# Patient Record
Sex: FEMALE | Race: BLACK OR AFRICAN AMERICAN | NOT HISPANIC OR LATINO
[De-identification: names, ages, dates, MRNs, and addresses within clinical notes are randomized per-mention and may not be internally consistent; named-entity substitution may affect disease eponyms.]

---

## 2017-01-04 ENCOUNTER — RESULT REVIEW (OUTPATIENT)
Age: 52
End: 2017-01-04

## 2017-01-04 NOTE — ASU PATIENT PROFILE, ADULT - VISION (WITH CORRECTIVE LENSES IF THE PATIENT USUALLY WEARS THEM):
needs reading glasses/Partially impaired: cannot see medication labels or newsprint, but can see obstacles in path, and the surrounding layout; can count fingers at arm's length

## 2017-01-04 NOTE — ASU PATIENT PROFILE, ADULT - PMH
Anxiety    Depression (ICD9 311)    Diabetes Mellitus Type II (ICD9 250.00)  No meds at present  GERD (Gastroesophageal Reflux Disease) (ICD9 530.81)    Hyperlipidemia (ICD9 272.4)    Incontinence of Urine (ICD9 788.30)    Insomnia    Lumbar herniated disc    Obesity (ICD9 278.00)    Right Knee arthroscopy 1998, left knee arthroscopy 2005    Umbilical Hernia repair 1997    Uterine leiomyoma

## 2017-01-04 NOTE — ASU PATIENT PROFILE, ADULT - PSH
Female stress incontinence  Interstem insertion  H/O abdominoplasty  9/2016 - [facility]  H/O: hysterectomy    Right knee arthroscopy 1998, left knee arthroscopy 2005    S/P Ankle Ligament Repair  Left-8/20/10  S/P MARIYA-BSO  2007  Tubal Ligation (ICD9 V26.51) 1997    Urinary incontinence  vaginal sling in 2011  Uterine artery embolization, Vaginal sling 01/2007

## 2017-01-05 ENCOUNTER — OUTPATIENT (OUTPATIENT)
Dept: OUTPATIENT SERVICES | Facility: HOSPITAL | Age: 52
LOS: 1 days | Discharge: HOME | End: 2017-01-05
Payer: MEDICAID

## 2017-01-05 ENCOUNTER — APPOINTMENT (OUTPATIENT)
Dept: UROGYNECOLOGY | Facility: HOSPITAL | Age: 52
End: 2017-01-05

## 2017-01-05 VITALS
WEIGHT: 188.94 LBS | TEMPERATURE: 98 F | HEART RATE: 91 BPM | OXYGEN SATURATION: 100 % | DIASTOLIC BLOOD PRESSURE: 97 MMHG | SYSTOLIC BLOOD PRESSURE: 153 MMHG | RESPIRATION RATE: 12 BRPM | HEIGHT: 62 IN

## 2017-01-05 VITALS
TEMPERATURE: 98 F | SYSTOLIC BLOOD PRESSURE: 139 MMHG | OXYGEN SATURATION: 99 % | RESPIRATION RATE: 15 BRPM | DIASTOLIC BLOOD PRESSURE: 71 MMHG | HEART RATE: 68 BPM

## 2017-01-05 DIAGNOSIS — Z98.890 OTHER SPECIFIED POSTPROCEDURAL STATES: Chronic | ICD-10-CM

## 2017-01-05 DIAGNOSIS — M54.5 LOW BACK PAIN: ICD-10-CM

## 2017-01-05 DIAGNOSIS — N39.3 STRESS INCONTINENCE (FEMALE) (MALE): Chronic | ICD-10-CM

## 2017-01-05 PROCEDURE — 88300 SURGICAL PATH GROSS: CPT | Mod: 26

## 2017-01-05 RX ORDER — SODIUM CHLORIDE 9 MG/ML
1000 INJECTION, SOLUTION INTRAVENOUS
Qty: 0 | Refills: 0 | Status: DISCONTINUED | OUTPATIENT
Start: 2017-01-05 | End: 2017-01-06

## 2017-01-05 RX ORDER — CYCLOBENZAPRINE HYDROCHLORIDE 10 MG/1
5 TABLET, FILM COATED ORAL THREE TIMES A DAY
Qty: 0 | Refills: 0 | Status: DISCONTINUED | OUTPATIENT
Start: 2017-01-05 | End: 2017-01-05

## 2017-01-05 RX ORDER — OXYCODONE HYDROCHLORIDE 5 MG/1
1 TABLET ORAL
Qty: 24 | Refills: 0
Start: 2017-01-05

## 2017-01-05 RX ORDER — CYCLOBENZAPRINE HYDROCHLORIDE 10 MG/1
1 TABLET, FILM COATED ORAL
Qty: 15 | Refills: 0
Start: 2017-01-05

## 2017-01-05 NOTE — ASU DISCHARGE PLAN (ADULT/PEDIATRIC). - NOTIFY
Numbness, tingling/GYN Fever>100.4/Numbness, color, or temperature change to extremity/Unable to Urinate/Inability to Tolerate Liquids or Foods/Excessive Diarrhea/Swelling that continues/Pain not relieved by Medications/Increased Irritability or Sluggishness/Persistent Nausea and Vomiting/Bleeding that does not stop

## 2017-01-05 NOTE — ASU DISCHARGE PLAN (ADULT/PEDIATRIC). - COMMENTS
Surgical Unit will call you on the next business day to follow up. Surgical [place] is open Monday - Friday.

## 2017-01-05 NOTE — ASU DISCHARGE PLAN (ADULT/PEDIATRIC). - INSTRUCTIONS
call office for appointment call office for appointment  Call your surgeon's office later today or tomorrow to schedule a follow up appointment.

## 2017-01-05 NOTE — ASU DISCHARGE PLAN (ADULT/PEDIATRIC). - MEDICATION SUMMARY - MEDICATIONS TO TAKE
I will START or STAY ON the medications listed below when I get home from the hospital:    acetaminophen-oxyCODONE 325 mg-5 mg oral tablet  -- 1 tab(s) by mouth every 4 hours, As Needed MDD:6 tabs  -- Caution federal law prohibits the transfer of this drug to any person other  than the person for whom it was prescribed.  May cause drowsiness.  Alcohol may intensify this effect.  Use care when operating dangerous machinery.  This prescription cannot be refilled.  This product contains acetaminophen.  Do not use  with any other product containing acetaminophen to prevent possible liver damage.  Using more of this medication than prescribed may cause serious breathing problems.    -- Indication: For pain    ibuprofen 600 mg oral tablet  -- 1 tab(s) by mouth every 6 hours.  Instructed to stop on 12/28/16  -- Indication: For pain    cyclobenzaprine 5 mg oral tablet  -- 1 tab(s) by mouth 3 times a day, As Needed  -- Indication: For home med I will START or STAY ON the medications listed below when I get home from the hospital:    acetaminophen-oxyCODONE 325 mg-5 mg oral tablet  -- 1 tab(s) by mouth every 4 hours, As Needed MDD:6 tabs  -- Caution federal law prohibits the transfer of this drug to any person other  than the person for whom it was prescribed.  May cause drowsiness.  Alcohol may intensify this effect.  Use care when operating dangerous machinery.  This prescription cannot be refilled.  This product contains acetaminophen.  Do not use  with any other product containing acetaminophen to prevent possible liver damage.  Using more of this medication than prescribed may cause serious breathing problems.    -- Indication: For pain    ibuprofen 600 mg oral tablet  -- 1 tab(s) by mouth every 6 hours.  Instructed to stop on 12/28/16  -- Indication: For pain    cyclobenzaprine 5 mg oral tablet  -- 1 tab(s) by mouth 3 times a day, As Needed  -- Some non-prescription drugs may aggravate your condition.  Read all labels carefully.  If a warning appears, check with your doctor before taking.  This drug may impair the ability to drive or operate machinery.  Use care until you become familiar with its effects.    -- Indication: For home med

## 2017-01-05 NOTE — ASU DISCHARGE PLAN (ADULT/PEDIATRIC). - ACTIVITY LEVEL
no intercourse/nothing per vagina/no tub baths/no douching/no tampons no tampons/no intercourse/nothing per vagina/no douching/no sports/gym/no tub baths/no heavy lifting/no exercise

## 2017-01-05 NOTE — ASU DISCHARGE PLAN (ADULT/PEDIATRIC). - CONDITIONS AT DISCHARGE
Patient is stable and meets discharge criteria. Patient made aware that he/she must wait on unit to be escorted to awaiting car in front of the main building after being discharged by Anesthesia Department.

## 2017-01-05 NOTE — BRIEF OPERATIVE NOTE - POST-OP DX
Chronic midline low back pain, with sciatica presence unspecified  01/05/2017    Active  Rose Marie Schroeder

## 2017-01-05 NOTE — ASU DISCHARGE PLAN (ADULT/PEDIATRIC). - NURSING INSTRUCTIONS
See medication reconcilliation record   When taking pain meds - take with food and know it may cause constipation. To prevent constipation increase fluids and fiber in diet. - Do NOT drive while on narcotics.   You were given IV Tylenol for pain management.  Please DO NOT take tylenol for the next 6-8 hours (until _6:45pm__ ). Please do not exceed 3000mg in 24hours. See medication reconcilliation record   When taking pain meds - take with food and know it may cause constipation. To prevent constipation increase fluids and fiber in diet. - Do NOT drive while on narcotics.

## 2017-01-09 LAB — SURGICAL PATHOLOGY STUDY: SIGNIFICANT CHANGE UP

## 2017-01-12 ENCOUNTER — FORM ENCOUNTER (OUTPATIENT)
Age: 52
End: 2017-01-12

## 2017-01-13 ENCOUNTER — OUTPATIENT (OUTPATIENT)
Dept: OUTPATIENT SERVICES | Facility: HOSPITAL | Age: 52
LOS: 1 days | End: 2017-01-13
Payer: MEDICAID

## 2017-01-13 ENCOUNTER — APPOINTMENT (OUTPATIENT)
Dept: CT IMAGING | Facility: CLINIC | Age: 52
End: 2017-01-13

## 2017-01-13 DIAGNOSIS — Z00.8 ENCOUNTER FOR OTHER GENERAL EXAMINATION: ICD-10-CM

## 2017-01-13 DIAGNOSIS — Z98.890 OTHER SPECIFIED POSTPROCEDURAL STATES: Chronic | ICD-10-CM

## 2017-01-13 DIAGNOSIS — N39.3 STRESS INCONTINENCE (FEMALE) (MALE): Chronic | ICD-10-CM

## 2017-01-13 PROCEDURE — 74177 CT ABD & PELVIS W/CONTRAST: CPT

## 2017-01-18 ENCOUNTER — APPOINTMENT (OUTPATIENT)
Dept: UROGYNECOLOGY | Facility: CLINIC | Age: 52
End: 2017-01-18

## 2017-01-18 ENCOUNTER — MESSAGE (OUTPATIENT)
Age: 52
End: 2017-01-18

## 2017-01-18 VITALS
TEMPERATURE: 98.1 F | RESPIRATION RATE: 16 BRPM | SYSTOLIC BLOOD PRESSURE: 130 MMHG | HEART RATE: 70 BPM | DIASTOLIC BLOOD PRESSURE: 74 MMHG

## 2017-01-18 DIAGNOSIS — R39.15 URGENCY OF URINATION: ICD-10-CM

## 2017-01-30 ENCOUNTER — MESSAGE (OUTPATIENT)
Age: 52
End: 2017-01-30

## 2017-03-14 ENCOUNTER — APPOINTMENT (OUTPATIENT)
Dept: SURGERY | Facility: HOSPITAL | Age: 52
End: 2017-03-14

## 2017-05-01 ENCOUNTER — OUTPATIENT (OUTPATIENT)
Dept: OUTPATIENT SERVICES | Facility: HOSPITAL | Age: 52
LOS: 1 days | End: 2017-05-01
Payer: MEDICAID

## 2017-05-01 DIAGNOSIS — Z98.890 OTHER SPECIFIED POSTPROCEDURAL STATES: Chronic | ICD-10-CM

## 2017-05-01 DIAGNOSIS — N39.3 STRESS INCONTINENCE (FEMALE) (MALE): Chronic | ICD-10-CM

## 2017-05-12 ENCOUNTER — OUTPATIENT (OUTPATIENT)
Dept: OUTPATIENT SERVICES | Facility: HOSPITAL | Age: 52
LOS: 1 days | Discharge: HOME | End: 2017-05-12

## 2017-05-12 VITALS
OXYGEN SATURATION: 100 % | SYSTOLIC BLOOD PRESSURE: 116 MMHG | TEMPERATURE: 98 F | WEIGHT: 179.9 LBS | HEART RATE: 70 BPM | RESPIRATION RATE: 14 BRPM | HEIGHT: 62 IN | DIASTOLIC BLOOD PRESSURE: 83 MMHG

## 2017-05-12 DIAGNOSIS — Z98.890 OTHER SPECIFIED POSTPROCEDURAL STATES: Chronic | ICD-10-CM

## 2017-05-12 DIAGNOSIS — K43.2 INCISIONAL HERNIA WITHOUT OBSTRUCTION OR GANGRENE: ICD-10-CM

## 2017-05-12 DIAGNOSIS — N39.3 STRESS INCONTINENCE (FEMALE) (MALE): Chronic | ICD-10-CM

## 2017-05-12 LAB
ABO RH CONFIRMATION: SIGNIFICANT CHANGE UP
ANION GAP SERPL CALC-SCNC: 9 MMOL/L — SIGNIFICANT CHANGE UP (ref 5–17)
APTT BLD: 39.2 SEC — HIGH (ref 27.5–37.4)
BASOPHILS # BLD AUTO: 0.1 K/UL — SIGNIFICANT CHANGE UP (ref 0–0.2)
BASOPHILS NFR BLD AUTO: 1.2 % — SIGNIFICANT CHANGE UP (ref 0–2)
BLD GP AB SCN SERPL QL: SIGNIFICANT CHANGE UP
BUN SERPL-MCNC: 17 MG/DL — SIGNIFICANT CHANGE UP (ref 7–23)
CALCIUM SERPL-MCNC: 8.7 MG/DL — SIGNIFICANT CHANGE UP (ref 8.5–10.1)
CHLORIDE SERPL-SCNC: 107 MMOL/L — SIGNIFICANT CHANGE UP (ref 96–108)
CO2 SERPL-SCNC: 27 MMOL/L — SIGNIFICANT CHANGE UP (ref 22–31)
CREAT SERPL-MCNC: 0.69 MG/DL — SIGNIFICANT CHANGE UP (ref 0.5–1.3)
EOSINOPHIL # BLD AUTO: 0.2 K/UL — SIGNIFICANT CHANGE UP (ref 0–0.5)
EOSINOPHIL NFR BLD AUTO: 3 % — SIGNIFICANT CHANGE UP (ref 0–6)
GLUCOSE SERPL-MCNC: 104 MG/DL — HIGH (ref 70–99)
HCT VFR BLD CALC: 40.1 % — SIGNIFICANT CHANGE UP (ref 34.5–45)
HGB BLD-MCNC: 12.8 G/DL — SIGNIFICANT CHANGE UP (ref 11.5–15.5)
INR BLD: 0.98 RATIO — SIGNIFICANT CHANGE UP (ref 0.88–1.16)
LYMPHOCYTES # BLD AUTO: 2.9 K/UL — SIGNIFICANT CHANGE UP (ref 1–3.3)
LYMPHOCYTES # BLD AUTO: 47 % — HIGH (ref 13–44)
MCHC RBC-ENTMCNC: 28.1 PG — SIGNIFICANT CHANGE UP (ref 27–34)
MCHC RBC-ENTMCNC: 31.8 GM/DL — LOW (ref 32–36)
MCV RBC AUTO: 88.3 FL — SIGNIFICANT CHANGE UP (ref 80–100)
MONOCYTES # BLD AUTO: 0.5 K/UL — SIGNIFICANT CHANGE UP (ref 0–0.9)
MONOCYTES NFR BLD AUTO: 7.5 % — SIGNIFICANT CHANGE UP (ref 2–14)
NEUTROPHILS # BLD AUTO: 2.5 K/UL — SIGNIFICANT CHANGE UP (ref 1.8–7.4)
NEUTROPHILS NFR BLD AUTO: 41.3 % — LOW (ref 43–77)
PLATELET # BLD AUTO: 189 K/UL — SIGNIFICANT CHANGE UP (ref 150–400)
POTASSIUM SERPL-MCNC: 3.9 MMOL/L — SIGNIFICANT CHANGE UP (ref 3.5–5.3)
POTASSIUM SERPL-SCNC: 3.9 MMOL/L — SIGNIFICANT CHANGE UP (ref 3.5–5.3)
PROTHROM AB SERPL-ACNC: 10.6 SEC — SIGNIFICANT CHANGE UP (ref 9.8–12.7)
RBC # BLD: 4.54 M/UL — SIGNIFICANT CHANGE UP (ref 3.8–5.2)
RBC # FLD: 14.1 % — SIGNIFICANT CHANGE UP (ref 10.3–14.5)
SODIUM SERPL-SCNC: 143 MMOL/L — SIGNIFICANT CHANGE UP (ref 135–145)
TYPE + AB SCN PNL BLD: SIGNIFICANT CHANGE UP
WBC # BLD: 6.1 K/UL — SIGNIFICANT CHANGE UP (ref 3.8–10.5)
WBC # FLD AUTO: 6.1 K/UL — SIGNIFICANT CHANGE UP (ref 3.8–10.5)

## 2017-05-12 NOTE — H&P PST ADULT - ASSESSMENT
This is a  51y /o [place] Female who speaks fluent English, who presents to [facility] prior to proposed procedure with Dr. Hwang for laparoscopic possible open incisional hernia repair with mesh, possible component separation .    1. Labs: per Dr. Hwang.  2. EKG on chart from Dr. Echevarria office.  3. Clearance completed per patient with Dr. Larios, 5/11/2017.  4. EZ sponges, Mupirocin ointment holistic sheet, pamphlet and day of procedure instructions provided and reviewed with patient.

## 2017-05-12 NOTE — H&P PST ADULT - HISTORY OF PRESENT ILLNESS
This is a  51y /o Mexican Female who speaks fluent English, who presents to [facility] prior to proposed procedure with Dr. Hwang for laparoscopic possible open incisional hernia repair with mesh, possible component separation . Reports s/p abdominoplasty 9/7/2016 and during healing of site had what appeared to be " a little pimple", which got bigger and bigger to inscion. Reports plastic surgeon sent her for CT scan of abdomen which confirmed hernia at incision and  referred to general surgeon. Denies pain at incisional site, fever or chills. Reports frequent itching sensation to incisional hernia site. Evaluated by Dr. Hwang and now presents for said procedure.

## 2017-05-12 NOTE — H&P PST ADULT - PMH
Anxiety    Depression (ICD9 311)    Diabetes Mellitus Type II (ICD9 250.00)  No meds at present  GERD (Gastroesophageal Reflux Disease) (ICD9 530.81)    Incisional hernia    Liver cyst  2017  Obesity (ICD9 278.00)    Right Knee arthroscopy 1998, left knee arthroscopy 2005    Umbilical Hernia repair 1997    Uterine leiomyoma

## 2017-05-12 NOTE — H&P PST ADULT - PSH
Female stress incontinence  Interstem insertion and removed 1/2017  H/O abdominoplasty  9/2016 - [facility]  H/O: hysterectomy  2007  Right knee arthroscopy 1998, left knee arthroscopy 2005 1998  S/P Ankle Ligament Repair  Left-8/20/10  S/P MARIYA-BSO  2007  Tubal Ligation (ICD9 V26.51) 1997 1997  Urinary incontinence  vaginal sling in 2011 and removed 1/2017  Uterine artery embolization, Vaginal sling 01/2007

## 2017-05-12 NOTE — H&P PST ADULT - VISION (WITH CORRECTIVE LENSES IF THE PATIENT USUALLY WEARS THEM):
Progressive lens'/Partially impaired: cannot see medication labels or newsprint, but can see obstacles in path, and the surrounding layout; can count fingers at arm's length

## 2017-05-15 ENCOUNTER — APPOINTMENT (OUTPATIENT)
Dept: MRI IMAGING | Facility: CLINIC | Age: 52
End: 2017-05-15

## 2017-05-16 ENCOUNTER — APPOINTMENT (OUTPATIENT)
Dept: MRI IMAGING | Facility: CLINIC | Age: 52
End: 2017-05-16

## 2017-05-16 ENCOUNTER — OUTPATIENT (OUTPATIENT)
Dept: OUTPATIENT SERVICES | Facility: HOSPITAL | Age: 52
LOS: 1 days | End: 2017-05-16
Payer: MEDICAID

## 2017-05-16 DIAGNOSIS — Z98.890 OTHER SPECIFIED POSTPROCEDURAL STATES: Chronic | ICD-10-CM

## 2017-05-16 DIAGNOSIS — Z00.8 ENCOUNTER FOR OTHER GENERAL EXAMINATION: ICD-10-CM

## 2017-05-16 DIAGNOSIS — N39.3 STRESS INCONTINENCE (FEMALE) (MALE): Chronic | ICD-10-CM

## 2017-05-17 PROCEDURE — 82565 ASSAY OF CREATININE: CPT

## 2017-05-17 PROCEDURE — 74183 MRI ABD W/O CNTR FLWD CNTR: CPT

## 2017-05-17 PROCEDURE — A9585: CPT

## 2017-05-18 RX ORDER — SODIUM CHLORIDE 9 MG/ML
1000 INJECTION INTRAMUSCULAR; INTRAVENOUS; SUBCUTANEOUS
Qty: 0 | Refills: 0 | Status: DISCONTINUED | OUTPATIENT
Start: 2017-05-19 | End: 2017-05-20

## 2017-05-18 RX ORDER — HYDROMORPHONE HYDROCHLORIDE 2 MG/ML
0.3 INJECTION INTRAMUSCULAR; INTRAVENOUS; SUBCUTANEOUS
Qty: 0 | Refills: 0 | Status: DISCONTINUED | OUTPATIENT
Start: 2017-05-19 | End: 2017-05-20

## 2017-05-18 RX ORDER — OXYCODONE HYDROCHLORIDE 5 MG/1
5 TABLET ORAL EVERY 4 HOURS
Qty: 0 | Refills: 0 | Status: DISCONTINUED | OUTPATIENT
Start: 2017-05-19 | End: 2017-05-20

## 2017-05-19 ENCOUNTER — INPATIENT (INPATIENT)
Facility: HOSPITAL | Age: 52
LOS: 9 days | Discharge: HOME | End: 2017-05-29
Attending: SURGERY | Admitting: SURGERY
Payer: MEDICAID

## 2017-05-19 ENCOUNTER — RESULT REVIEW (OUTPATIENT)
Age: 52
End: 2017-05-19

## 2017-05-19 VITALS
OXYGEN SATURATION: 100 % | SYSTOLIC BLOOD PRESSURE: 108 MMHG | RESPIRATION RATE: 16 BRPM | HEART RATE: 66 BPM | WEIGHT: 179.9 LBS | HEIGHT: 62 IN | TEMPERATURE: 98 F | DIASTOLIC BLOOD PRESSURE: 69 MMHG

## 2017-05-19 DIAGNOSIS — N39.3 STRESS INCONTINENCE (FEMALE) (MALE): Chronic | ICD-10-CM

## 2017-05-19 DIAGNOSIS — Z98.890 OTHER SPECIFIED POSTPROCEDURAL STATES: Chronic | ICD-10-CM

## 2017-05-19 LAB
ANION GAP SERPL CALC-SCNC: 9 MMOL/L — SIGNIFICANT CHANGE UP (ref 5–17)
APTT BLD: 34.8 SEC — SIGNIFICANT CHANGE UP (ref 27.5–37.4)
BUN SERPL-MCNC: 12 MG/DL — SIGNIFICANT CHANGE UP (ref 7–23)
CALCIUM SERPL-MCNC: 7.9 MG/DL — LOW (ref 8.5–10.1)
CHLORIDE SERPL-SCNC: 111 MMOL/L — HIGH (ref 96–108)
CO2 SERPL-SCNC: 24 MMOL/L — SIGNIFICANT CHANGE UP (ref 22–31)
CREAT SERPL-MCNC: 0.68 MG/DL — SIGNIFICANT CHANGE UP (ref 0.5–1.3)
GLUCOSE SERPL-MCNC: 151 MG/DL — HIGH (ref 70–99)
HCT VFR BLD CALC: 38.2 % — SIGNIFICANT CHANGE UP (ref 34.5–45)
HGB BLD-MCNC: 12.5 G/DL — SIGNIFICANT CHANGE UP (ref 11.5–15.5)
INR BLD: 1.08 RATIO — SIGNIFICANT CHANGE UP (ref 0.88–1.16)
MAGNESIUM SERPL-MCNC: 1.9 MG/DL — SIGNIFICANT CHANGE UP (ref 1.6–2.6)
MCHC RBC-ENTMCNC: 28.3 PG — SIGNIFICANT CHANGE UP (ref 27–34)
MCHC RBC-ENTMCNC: 32.7 GM/DL — SIGNIFICANT CHANGE UP (ref 32–36)
MCV RBC AUTO: 86.4 FL — SIGNIFICANT CHANGE UP (ref 80–100)
PLATELET # BLD AUTO: 190 K/UL — SIGNIFICANT CHANGE UP (ref 150–400)
POTASSIUM SERPL-MCNC: 3.7 MMOL/L — SIGNIFICANT CHANGE UP (ref 3.5–5.3)
POTASSIUM SERPL-SCNC: 3.7 MMOL/L — SIGNIFICANT CHANGE UP (ref 3.5–5.3)
PROTHROM AB SERPL-ACNC: 11.7 SEC — SIGNIFICANT CHANGE UP (ref 9.8–12.7)
RBC # BLD: 4.42 M/UL — SIGNIFICANT CHANGE UP (ref 3.8–5.2)
RBC # FLD: 13.7 % — SIGNIFICANT CHANGE UP (ref 10.3–14.5)
SODIUM SERPL-SCNC: 144 MMOL/L — SIGNIFICANT CHANGE UP (ref 135–145)
WBC # BLD: 14.1 K/UL — HIGH (ref 3.8–10.5)
WBC # FLD AUTO: 14.1 K/UL — HIGH (ref 3.8–10.5)

## 2017-05-19 RX ORDER — ONDANSETRON 8 MG/1
4 TABLET, FILM COATED ORAL EVERY 6 HOURS
Qty: 0 | Refills: 0 | Status: DISCONTINUED | OUTPATIENT
Start: 2017-05-19 | End: 2017-05-29

## 2017-05-19 RX ORDER — NALOXONE HYDROCHLORIDE 4 MG/.1ML
0.1 SPRAY NASAL
Qty: 0 | Refills: 0 | Status: DISCONTINUED | OUTPATIENT
Start: 2017-05-19 | End: 2017-05-29

## 2017-05-19 RX ORDER — SODIUM CHLORIDE 9 MG/ML
3 INJECTION INTRAMUSCULAR; INTRAVENOUS; SUBCUTANEOUS EVERY 8 HOURS
Qty: 0 | Refills: 0 | Status: DISCONTINUED | OUTPATIENT
Start: 2017-05-19 | End: 2017-05-19

## 2017-05-19 RX ORDER — SODIUM CHLORIDE 9 MG/ML
1000 INJECTION, SOLUTION INTRAVENOUS
Qty: 0 | Refills: 0 | Status: DISCONTINUED | OUTPATIENT
Start: 2017-05-19 | End: 2017-05-22

## 2017-05-19 RX ORDER — CEFAZOLIN SODIUM 1 G
2000 VIAL (EA) INJECTION EVERY 8 HOURS
Qty: 0 | Refills: 0 | Status: DISCONTINUED | OUTPATIENT
Start: 2017-05-19 | End: 2017-05-29

## 2017-05-19 RX ORDER — HEPARIN SODIUM 5000 [USP'U]/ML
5000 INJECTION INTRAVENOUS; SUBCUTANEOUS EVERY 8 HOURS
Qty: 0 | Refills: 0 | Status: DISCONTINUED | OUTPATIENT
Start: 2017-05-19 | End: 2017-05-29

## 2017-05-19 RX ORDER — ACETAMINOPHEN 500 MG
1000 TABLET ORAL ONCE
Qty: 0 | Refills: 0 | Status: COMPLETED | OUTPATIENT
Start: 2017-05-19 | End: 2017-05-19

## 2017-05-19 RX ORDER — HYDROMORPHONE HYDROCHLORIDE 2 MG/ML
30 INJECTION INTRAMUSCULAR; INTRAVENOUS; SUBCUTANEOUS
Qty: 0 | Refills: 0 | Status: DISCONTINUED | OUTPATIENT
Start: 2017-05-19 | End: 2017-05-23

## 2017-05-19 RX ORDER — ONDANSETRON 8 MG/1
4 TABLET, FILM COATED ORAL ONCE
Qty: 0 | Refills: 0 | Status: DISCONTINUED | OUTPATIENT
Start: 2017-05-19 | End: 2017-05-19

## 2017-05-19 RX ADMIN — HYDROMORPHONE HYDROCHLORIDE 30 MILLILITER(S): 2 INJECTION INTRAMUSCULAR; INTRAVENOUS; SUBCUTANEOUS at 17:55

## 2017-05-19 RX ADMIN — SODIUM CHLORIDE 75 MILLILITER(S): 9 INJECTION INTRAMUSCULAR; INTRAVENOUS; SUBCUTANEOUS at 23:16

## 2017-05-19 RX ADMIN — SODIUM CHLORIDE 75 MILLILITER(S): 9 INJECTION INTRAMUSCULAR; INTRAVENOUS; SUBCUTANEOUS at 17:56

## 2017-05-19 RX ADMIN — Medication 400 MILLIGRAM(S): at 18:27

## 2017-05-19 NOTE — BRIEF OPERATIVE NOTE - POST-OP DX
Scar of abdominal skin  05/19/2017    Active  Abbey Alicea
Incisional hernia, without obstruction or gangrene  05/19/2017    Active  Ron Glaser  Intestinal adhesions  05/19/2017    Active  Ron Glaser  Scar of abdominal skin  05/19/2017    Active  Abbey Alicea

## 2017-05-19 NOTE — ASU PATIENT PROFILE, ADULT - PSH
Female stress incontinence  Interstem insertion and removed 1/2017  H/O abdominoplasty  9/2016 - [facility]  H/O: hysterectomy  2007  Right knee arthroscopy 1998, left knee arthroscopy 2005 1998  S/P Ankle Ligament Repair  Left-8/20/10  S/P MARIYA-BSO  2007  Tubal Ligation (ICD9 V26.51) 1997 1997  Urinary incontinence  vaginal sling in 2011 and removed 1/2017  Uterine artery embolization, Vaginal sling 01/2007

## 2017-05-19 NOTE — BRIEF OPERATIVE NOTE - PROCEDURE
Revision of abdominoplasty  05/19/2017  Abdominal wall plication, revision of abdominal scar  Active  [email or web address]
Incisional hernia repair with mesh  05/19/2017    Active  BARBARAVIN

## 2017-05-19 NOTE — BRIEF OPERATIVE NOTE - PRE-OP DX
Scar of abdominal skin  05/19/2017    Active  Abbey Alicea
Adhesion of intestine  05/19/2017    Active  Ron Glaser  Incisional hernia, without obstruction or gangrene  05/19/2017    Active  Ron Glaser  Scar of abdominal skin  05/19/2017    Active  Abbey Alicea

## 2017-05-20 LAB
ANION GAP SERPL CALC-SCNC: 10 MMOL/L — SIGNIFICANT CHANGE UP (ref 5–17)
BASOPHILS # BLD AUTO: 0 K/UL — SIGNIFICANT CHANGE UP (ref 0–0.2)
BASOPHILS NFR BLD AUTO: 0.2 % — SIGNIFICANT CHANGE UP (ref 0–2)
BUN SERPL-MCNC: 8 MG/DL — SIGNIFICANT CHANGE UP (ref 7–23)
CALCIUM SERPL-MCNC: 7.9 MG/DL — LOW (ref 8.5–10.1)
CHLORIDE SERPL-SCNC: 108 MMOL/L — SIGNIFICANT CHANGE UP (ref 96–108)
CO2 SERPL-SCNC: 24 MMOL/L — SIGNIFICANT CHANGE UP (ref 22–31)
CREAT SERPL-MCNC: 0.66 MG/DL — SIGNIFICANT CHANGE UP (ref 0.5–1.3)
EOSINOPHIL # BLD AUTO: 0 K/UL — SIGNIFICANT CHANGE UP (ref 0–0.5)
EOSINOPHIL NFR BLD AUTO: 0 % — SIGNIFICANT CHANGE UP (ref 0–6)
GLUCOSE SERPL-MCNC: 133 MG/DL — HIGH (ref 70–99)
HCT VFR BLD CALC: 36.6 % — SIGNIFICANT CHANGE UP (ref 34.5–45)
HGB BLD-MCNC: 11.7 G/DL — SIGNIFICANT CHANGE UP (ref 11.5–15.5)
LYMPHOCYTES # BLD AUTO: 1.5 K/UL — SIGNIFICANT CHANGE UP (ref 1–3.3)
LYMPHOCYTES # BLD AUTO: 13.2 % — SIGNIFICANT CHANGE UP (ref 13–44)
MCHC RBC-ENTMCNC: 28.7 PG — SIGNIFICANT CHANGE UP (ref 27–34)
MCHC RBC-ENTMCNC: 31.8 GM/DL — LOW (ref 32–36)
MCV RBC AUTO: 90 FL — SIGNIFICANT CHANGE UP (ref 80–100)
MONOCYTES # BLD AUTO: 0.6 K/UL — SIGNIFICANT CHANGE UP (ref 0–0.9)
MONOCYTES NFR BLD AUTO: 5.4 % — SIGNIFICANT CHANGE UP (ref 2–14)
NEUTROPHILS # BLD AUTO: 9.3 K/UL — HIGH (ref 1.8–7.4)
NEUTROPHILS NFR BLD AUTO: 81.2 % — HIGH (ref 43–77)
PLATELET # BLD AUTO: 182 K/UL — SIGNIFICANT CHANGE UP (ref 150–400)
POTASSIUM SERPL-MCNC: 3.8 MMOL/L — SIGNIFICANT CHANGE UP (ref 3.5–5.3)
POTASSIUM SERPL-SCNC: 3.8 MMOL/L — SIGNIFICANT CHANGE UP (ref 3.5–5.3)
RBC # BLD: 4.07 M/UL — SIGNIFICANT CHANGE UP (ref 3.8–5.2)
RBC # FLD: 14 % — SIGNIFICANT CHANGE UP (ref 10.3–14.5)
SODIUM SERPL-SCNC: 142 MMOL/L — SIGNIFICANT CHANGE UP (ref 135–145)
WBC # BLD: 11.5 K/UL — HIGH (ref 3.8–10.5)
WBC # FLD AUTO: 11.5 K/UL — HIGH (ref 3.8–10.5)

## 2017-05-20 RX ORDER — DIPHENHYDRAMINE HCL 50 MG
50 CAPSULE ORAL EVERY 6 HOURS
Qty: 0 | Refills: 0 | Status: DISCONTINUED | OUTPATIENT
Start: 2017-05-20 | End: 2017-05-29

## 2017-05-20 RX ADMIN — Medication 50 MILLIGRAM(S): at 13:24

## 2017-05-20 RX ADMIN — Medication 100 MILLIGRAM(S): at 23:19

## 2017-05-20 RX ADMIN — HEPARIN SODIUM 5000 UNIT(S): 5000 INJECTION INTRAVENOUS; SUBCUTANEOUS at 00:17

## 2017-05-20 RX ADMIN — Medication 100 MILLIGRAM(S): at 00:17

## 2017-05-20 RX ADMIN — Medication 100 MILLIGRAM(S): at 13:39

## 2017-05-20 RX ADMIN — HEPARIN SODIUM 5000 UNIT(S): 5000 INJECTION INTRAVENOUS; SUBCUTANEOUS at 06:05

## 2017-05-20 RX ADMIN — HEPARIN SODIUM 5000 UNIT(S): 5000 INJECTION INTRAVENOUS; SUBCUTANEOUS at 23:19

## 2017-05-20 RX ADMIN — Medication 50 MILLIGRAM(S): at 23:20

## 2017-05-20 RX ADMIN — SODIUM CHLORIDE 75 MILLILITER(S): 9 INJECTION INTRAMUSCULAR; INTRAVENOUS; SUBCUTANEOUS at 12:07

## 2017-05-20 RX ADMIN — HEPARIN SODIUM 5000 UNIT(S): 5000 INJECTION INTRAVENOUS; SUBCUTANEOUS at 13:39

## 2017-05-20 RX ADMIN — Medication 100 MILLIGRAM(S): at 06:05

## 2017-05-21 DIAGNOSIS — K91.3 POSTPROCEDURAL INTESTINAL OBSTRUCTION: ICD-10-CM

## 2017-05-21 DIAGNOSIS — K43.2 INCISIONAL HERNIA WITHOUT OBSTRUCTION OR GANGRENE: ICD-10-CM

## 2017-05-21 RX ORDER — PANTOPRAZOLE SODIUM 20 MG/1
40 TABLET, DELAYED RELEASE ORAL EVERY 12 HOURS
Qty: 0 | Refills: 0 | Status: COMPLETED | OUTPATIENT
Start: 2017-05-21 | End: 2017-05-23

## 2017-05-21 RX ADMIN — HEPARIN SODIUM 5000 UNIT(S): 5000 INJECTION INTRAVENOUS; SUBCUTANEOUS at 05:26

## 2017-05-21 RX ADMIN — ONDANSETRON 4 MILLIGRAM(S): 8 TABLET, FILM COATED ORAL at 08:18

## 2017-05-21 RX ADMIN — HEPARIN SODIUM 5000 UNIT(S): 5000 INJECTION INTRAVENOUS; SUBCUTANEOUS at 21:16

## 2017-05-21 RX ADMIN — Medication 100 MILLIGRAM(S): at 13:29

## 2017-05-21 RX ADMIN — ONDANSETRON 4 MILLIGRAM(S): 8 TABLET, FILM COATED ORAL at 13:29

## 2017-05-21 RX ADMIN — SODIUM CHLORIDE 125 MILLILITER(S): 9 INJECTION, SOLUTION INTRAVENOUS at 17:33

## 2017-05-21 RX ADMIN — Medication 100 MILLIGRAM(S): at 21:15

## 2017-05-21 RX ADMIN — PANTOPRAZOLE SODIUM 40 MILLIGRAM(S): 20 TABLET, DELAYED RELEASE ORAL at 17:51

## 2017-05-21 RX ADMIN — ONDANSETRON 4 MILLIGRAM(S): 8 TABLET, FILM COATED ORAL at 02:05

## 2017-05-21 RX ADMIN — HEPARIN SODIUM 5000 UNIT(S): 5000 INJECTION INTRAVENOUS; SUBCUTANEOUS at 13:30

## 2017-05-21 RX ADMIN — Medication 100 MILLIGRAM(S): at 05:25

## 2017-05-21 RX ADMIN — SODIUM CHLORIDE 125 MILLILITER(S): 9 INJECTION, SOLUTION INTRAVENOUS at 08:18

## 2017-05-22 PROCEDURE — 88302 TISSUE EXAM BY PATHOLOGIST: CPT | Mod: 26

## 2017-05-22 RX ORDER — DEXTROSE MONOHYDRATE, SODIUM CHLORIDE, AND POTASSIUM CHLORIDE 50; .745; 4.5 G/1000ML; G/1000ML; G/1000ML
1000 INJECTION, SOLUTION INTRAVENOUS
Qty: 0 | Refills: 0 | Status: DISCONTINUED | OUTPATIENT
Start: 2017-05-22 | End: 2017-05-23

## 2017-05-22 RX ADMIN — Medication 100 MILLIGRAM(S): at 15:10

## 2017-05-22 RX ADMIN — Medication 100 MILLIGRAM(S): at 06:00

## 2017-05-22 RX ADMIN — PANTOPRAZOLE SODIUM 40 MILLIGRAM(S): 20 TABLET, DELAYED RELEASE ORAL at 05:58

## 2017-05-22 RX ADMIN — HEPARIN SODIUM 5000 UNIT(S): 5000 INJECTION INTRAVENOUS; SUBCUTANEOUS at 05:58

## 2017-05-22 RX ADMIN — HEPARIN SODIUM 5000 UNIT(S): 5000 INJECTION INTRAVENOUS; SUBCUTANEOUS at 15:10

## 2017-05-22 RX ADMIN — PANTOPRAZOLE SODIUM 40 MILLIGRAM(S): 20 TABLET, DELAYED RELEASE ORAL at 17:51

## 2017-05-22 RX ADMIN — Medication 50 MILLIGRAM(S): at 21:34

## 2017-05-22 RX ADMIN — Medication 100 MILLIGRAM(S): at 21:08

## 2017-05-22 RX ADMIN — HEPARIN SODIUM 5000 UNIT(S): 5000 INJECTION INTRAVENOUS; SUBCUTANEOUS at 21:09

## 2017-05-22 RX ADMIN — DEXTROSE MONOHYDRATE, SODIUM CHLORIDE, AND POTASSIUM CHLORIDE 100 MILLILITER(S): 50; .745; 4.5 INJECTION, SOLUTION INTRAVENOUS at 12:09

## 2017-05-23 RX ORDER — HYDROMORPHONE HYDROCHLORIDE 2 MG/ML
1 INJECTION INTRAMUSCULAR; INTRAVENOUS; SUBCUTANEOUS EVERY 6 HOURS
Qty: 0 | Refills: 0 | Status: DISCONTINUED | OUTPATIENT
Start: 2017-05-23 | End: 2017-05-29

## 2017-05-23 RX ORDER — DEXTROSE MONOHYDRATE, SODIUM CHLORIDE, AND POTASSIUM CHLORIDE 50; .745; 4.5 G/1000ML; G/1000ML; G/1000ML
1000 INJECTION, SOLUTION INTRAVENOUS
Qty: 0 | Refills: 0 | Status: DISCONTINUED | OUTPATIENT
Start: 2017-05-23 | End: 2017-05-24

## 2017-05-23 RX ORDER — HYDROXYZINE HCL 10 MG
25 TABLET ORAL EVERY 4 HOURS
Qty: 0 | Refills: 0 | Status: DISCONTINUED | OUTPATIENT
Start: 2017-05-23 | End: 2017-05-23

## 2017-05-23 RX ORDER — HYDROXYZINE HCL 10 MG
25 TABLET ORAL EVERY 6 HOURS
Qty: 0 | Refills: 0 | Status: DISCONTINUED | OUTPATIENT
Start: 2017-05-23 | End: 2017-05-29

## 2017-05-23 RX ORDER — ACETAMINOPHEN 500 MG
650 TABLET ORAL EVERY 4 HOURS
Qty: 0 | Refills: 0 | Status: DISCONTINUED | OUTPATIENT
Start: 2017-05-23 | End: 2017-05-29

## 2017-05-23 RX ORDER — OXYCODONE HYDROCHLORIDE 5 MG/1
5 TABLET ORAL EVERY 4 HOURS
Qty: 0 | Refills: 0 | Status: DISCONTINUED | OUTPATIENT
Start: 2017-05-23 | End: 2017-05-29

## 2017-05-23 RX ADMIN — Medication 100 MILLIGRAM(S): at 21:53

## 2017-05-23 RX ADMIN — HEPARIN SODIUM 5000 UNIT(S): 5000 INJECTION INTRAVENOUS; SUBCUTANEOUS at 06:08

## 2017-05-23 RX ADMIN — DEXTROSE MONOHYDRATE, SODIUM CHLORIDE, AND POTASSIUM CHLORIDE 75 MILLILITER(S): 50; .745; 4.5 INJECTION, SOLUTION INTRAVENOUS at 17:40

## 2017-05-23 RX ADMIN — DEXTROSE MONOHYDRATE, SODIUM CHLORIDE, AND POTASSIUM CHLORIDE 100 MILLILITER(S): 50; .745; 4.5 INJECTION, SOLUTION INTRAVENOUS at 03:50

## 2017-05-23 RX ADMIN — Medication 100 MILLIGRAM(S): at 06:07

## 2017-05-23 RX ADMIN — HEPARIN SODIUM 5000 UNIT(S): 5000 INJECTION INTRAVENOUS; SUBCUTANEOUS at 21:53

## 2017-05-23 RX ADMIN — PANTOPRAZOLE SODIUM 40 MILLIGRAM(S): 20 TABLET, DELAYED RELEASE ORAL at 06:08

## 2017-05-23 RX ADMIN — Medication 100 MILLIGRAM(S): at 13:21

## 2017-05-23 RX ADMIN — Medication 50 MILLIGRAM(S): at 13:37

## 2017-05-23 RX ADMIN — HEPARIN SODIUM 5000 UNIT(S): 5000 INJECTION INTRAVENOUS; SUBCUTANEOUS at 13:30

## 2017-05-23 RX ADMIN — Medication 25 MILLIGRAM(S): at 03:06

## 2017-05-24 RX ORDER — PANTOPRAZOLE SODIUM 20 MG/1
40 TABLET, DELAYED RELEASE ORAL
Qty: 0 | Refills: 0 | Status: DISCONTINUED | OUTPATIENT
Start: 2017-05-24 | End: 2017-05-25

## 2017-05-24 RX ADMIN — HEPARIN SODIUM 5000 UNIT(S): 5000 INJECTION INTRAVENOUS; SUBCUTANEOUS at 05:16

## 2017-05-24 RX ADMIN — Medication 100 MILLIGRAM(S): at 15:27

## 2017-05-24 RX ADMIN — HEPARIN SODIUM 5000 UNIT(S): 5000 INJECTION INTRAVENOUS; SUBCUTANEOUS at 15:27

## 2017-05-24 RX ADMIN — Medication 100 MILLIGRAM(S): at 22:54

## 2017-05-24 RX ADMIN — HEPARIN SODIUM 5000 UNIT(S): 5000 INJECTION INTRAVENOUS; SUBCUTANEOUS at 22:54

## 2017-05-24 RX ADMIN — Medication 100 MILLIGRAM(S): at 05:16

## 2017-05-24 RX ADMIN — DEXTROSE MONOHYDRATE, SODIUM CHLORIDE, AND POTASSIUM CHLORIDE 75 MILLILITER(S): 50; .745; 4.5 INJECTION, SOLUTION INTRAVENOUS at 09:21

## 2017-05-24 RX ADMIN — PANTOPRAZOLE SODIUM 40 MILLIGRAM(S): 20 TABLET, DELAYED RELEASE ORAL at 11:57

## 2017-05-25 ENCOUNTER — TRANSCRIPTION ENCOUNTER (OUTPATIENT)
Age: 52
End: 2017-05-25

## 2017-05-25 LAB
ANION GAP SERPL CALC-SCNC: 9 MMOL/L — SIGNIFICANT CHANGE UP (ref 5–17)
BUN SERPL-MCNC: 9 MG/DL — SIGNIFICANT CHANGE UP (ref 7–23)
CALCIUM SERPL-MCNC: 9.2 MG/DL — SIGNIFICANT CHANGE UP (ref 8.5–10.1)
CHLORIDE SERPL-SCNC: 102 MMOL/L — SIGNIFICANT CHANGE UP (ref 96–108)
CO2 SERPL-SCNC: 29 MMOL/L — SIGNIFICANT CHANGE UP (ref 22–31)
CREAT SERPL-MCNC: 0.72 MG/DL — SIGNIFICANT CHANGE UP (ref 0.5–1.3)
GLUCOSE SERPL-MCNC: 111 MG/DL — HIGH (ref 70–99)
HCT VFR BLD CALC: 36.5 % — SIGNIFICANT CHANGE UP (ref 34.5–45)
HGB BLD-MCNC: 12.2 G/DL — SIGNIFICANT CHANGE UP (ref 11.5–15.5)
MCHC RBC-ENTMCNC: 28.7 PG — SIGNIFICANT CHANGE UP (ref 27–34)
MCHC RBC-ENTMCNC: 33.3 GM/DL — SIGNIFICANT CHANGE UP (ref 32–36)
MCV RBC AUTO: 86.1 FL — SIGNIFICANT CHANGE UP (ref 80–100)
PLATELET # BLD AUTO: 227 K/UL — SIGNIFICANT CHANGE UP (ref 150–400)
POTASSIUM SERPL-MCNC: 3.8 MMOL/L — SIGNIFICANT CHANGE UP (ref 3.5–5.3)
POTASSIUM SERPL-SCNC: 3.8 MMOL/L — SIGNIFICANT CHANGE UP (ref 3.5–5.3)
RBC # BLD: 4.24 M/UL — SIGNIFICANT CHANGE UP (ref 3.8–5.2)
RBC # FLD: 13.5 % — SIGNIFICANT CHANGE UP (ref 10.3–14.5)
SODIUM SERPL-SCNC: 140 MMOL/L — SIGNIFICANT CHANGE UP (ref 135–145)
SURGICAL PATHOLOGY FINAL REPORT - CH: SIGNIFICANT CHANGE UP
WBC # BLD: 7.6 K/UL — SIGNIFICANT CHANGE UP (ref 3.8–10.5)
WBC # FLD AUTO: 7.6 K/UL — SIGNIFICANT CHANGE UP (ref 3.8–10.5)

## 2017-05-25 PROCEDURE — 74022 RADEX COMPL AQT ABD SERIES: CPT | Mod: 26

## 2017-05-25 RX ORDER — SODIUM CHLORIDE 9 MG/ML
1000 INJECTION INTRAMUSCULAR; INTRAVENOUS; SUBCUTANEOUS
Qty: 0 | Refills: 0 | Status: DISCONTINUED | OUTPATIENT
Start: 2017-05-25 | End: 2017-05-26

## 2017-05-25 RX ORDER — PANTOPRAZOLE SODIUM 20 MG/1
40 TABLET, DELAYED RELEASE ORAL DAILY
Qty: 0 | Refills: 0 | Status: DISCONTINUED | OUTPATIENT
Start: 2017-05-25 | End: 2017-05-28

## 2017-05-25 RX ORDER — CEPHALEXIN 500 MG
1 CAPSULE ORAL
Qty: 28 | Refills: 0
Start: 2017-05-25 | End: 2017-06-01

## 2017-05-25 RX ORDER — PANTOPRAZOLE SODIUM 20 MG/1
1 TABLET, DELAYED RELEASE ORAL
Qty: 0 | Refills: 0 | DISCHARGE
Start: 2017-05-25

## 2017-05-25 RX ORDER — OXYCODONE HYDROCHLORIDE 5 MG/1
1 TABLET ORAL
Qty: 20 | Refills: 0
Start: 2017-05-25 | End: 2017-05-30

## 2017-05-25 RX ADMIN — Medication 50 MILLIGRAM(S): at 00:06

## 2017-05-25 RX ADMIN — ONDANSETRON 4 MILLIGRAM(S): 8 TABLET, FILM COATED ORAL at 18:34

## 2017-05-25 RX ADMIN — PANTOPRAZOLE SODIUM 40 MILLIGRAM(S): 20 TABLET, DELAYED RELEASE ORAL at 05:16

## 2017-05-25 RX ADMIN — HEPARIN SODIUM 5000 UNIT(S): 5000 INJECTION INTRAVENOUS; SUBCUTANEOUS at 05:16

## 2017-05-25 RX ADMIN — Medication 100 MILLIGRAM(S): at 05:16

## 2017-05-25 RX ADMIN — Medication 10 MILLIGRAM(S): at 14:33

## 2017-05-25 RX ADMIN — Medication 100 MILLIGRAM(S): at 14:34

## 2017-05-25 RX ADMIN — SODIUM CHLORIDE 100 MILLILITER(S): 9 INJECTION INTRAMUSCULAR; INTRAVENOUS; SUBCUTANEOUS at 19:13

## 2017-05-25 RX ADMIN — Medication 100 MILLIGRAM(S): at 22:01

## 2017-05-25 RX ADMIN — PANTOPRAZOLE SODIUM 40 MILLIGRAM(S): 20 TABLET, DELAYED RELEASE ORAL at 19:13

## 2017-05-25 RX ADMIN — HEPARIN SODIUM 5000 UNIT(S): 5000 INJECTION INTRAVENOUS; SUBCUTANEOUS at 22:01

## 2017-05-25 NOTE — DISCHARGE NOTE ADULT - PLAN OF CARE
Pain control, diet tolerance follow up with Dr Hwang in 1 week, [phone]. Please seek immediate medical attention for fever>100.4, worsening abdominal pain, chest pain, shortness of breath, any adverse changes to health pain control follow up with your private plastic surgeon Dr Vo as instructed. Take antibiotics until your plastic surgeon removes your drain

## 2017-05-25 NOTE — DISCHARGE NOTE ADULT - PATIENT PORTAL LINK FT
“You can access the Follow[facility] Patient Portal, offered by [facility], by registering with the following website: [email or web address]”

## 2017-05-25 NOTE — DISCHARGE NOTE ADULT - CARE PROVIDER_API CALL
Ata Vo), Plastic Surgery  [address]  [place] [address]  Phone: [phone]  Fax: [phone]    La Hwang (GINNY), Surgery; Surgical Critical Care  [address] [place] [address]  Phone: [phone]  Fax: [phone]

## 2017-05-25 NOTE — DISCHARGE NOTE ADULT - NS AS ACTIVITY OBS
Do not drive or operate machinery/No Heavy lifting/straining/Showering allowed/Walking-Outdoors allowed/Do not make important decisions/Stairs allowed/Walking-Indoors allowed

## 2017-05-25 NOTE — DISCHARGE NOTE ADULT - CARE PLAN
Principal Discharge DX:	Incisional hernia  Goal:	Pain control, diet tolerance  Instructions for follow-up, activity and diet:	follow up with Dr Hwang in 1 week, [phone]. Please seek immediate medical attention for fever>100.4, worsening abdominal pain, chest pain, shortness of breath, any adverse changes to health  Secondary Diagnosis:	H/O abdominoplasty  Goal:	pain control  Instructions for follow-up, activity and diet:	follow up with your private plastic surgeon Dr Vo as instructed. Take antibiotics until your plastic surgeon removes your drain Principal Discharge DX:	Incisional hernia  Goal:	Pain control, diet tolerance  Instructions for follow-up, activity and diet:	follow up with Dr Hwang in 1 week, [phone]. Please seek immediate medical attention for fever>100.4, worsening abdominal pain, chest pain, shortness of breath, any adverse changes to health  Secondary Diagnosis:	H/O abdominoplasty  Goal:	pain control  Instructions for follow-up, activity and diet:	follow up with your private plastic surgeon Dr Vo as instructed. Take antibiotics until your plastic surgeon removes your drain Principal Discharge DX:	Incisional hernia  Goal:	Pain control, diet tolerance  Instructions for follow-up, activity and diet:	follow up with Dr Hwang in 1 week, [phone]. Please seek immediate medical attention for fever>100.4, worsening abdominal pain, chest pain, shortness of breath, any adverse changes to health  Secondary Diagnosis:	H/O abdominoplasty  Goal:	pain control  Instructions for follow-up, activity and diet:	follow up with your private plastic surgeon Dr Vo as instructed. Take antibiotics until your plastic surgeon removes your drain Principal Discharge DX:	Incisional hernia  Goal:	Pain control, diet tolerance  Instructions for follow-up, activity and diet:	follow up with Dr Hwang in 1 week, [phone]. Please seek immediate medical attention for fever>100.4, worsening abdominal pain, chest pain, shortness of breath, any adverse changes to health  Secondary Diagnosis:	H/O abdominoplasty  Goal:	pain control  Instructions for follow-up, activity and diet:	follow up with your private plastic surgeon Dr Vo as instructed. Take antibiotics until your plastic surgeon removes your drain Principal Discharge DX:	Incisional hernia  Goal:	Pain control, diet tolerance  Instructions for follow-up, activity and diet:	follow up with Dr Hwang in 1 week, [phone]. Please seek immediate medical attention for fever>100.4, worsening abdominal pain, chest pain, shortness of breath, any adverse changes to health  Secondary Diagnosis:	H/O abdominoplasty  Goal:	pain control  Instructions for follow-up, activity and diet:	follow up with your private plastic surgeon Dr Vo as instructed. Take antibiotics until your plastic surgeon removes your drain Principal Discharge DX:	Incisional hernia  Goal:	Pain control, diet tolerance  Instructions for follow-up, activity and diet:	follow up with Dr Hwang in 1 week, [phone]. Please seek immediate medical attention for fever>100.4, worsening abdominal pain, chest pain, shortness of breath, any adverse changes to health  Secondary Diagnosis:	H/O abdominoplasty  Goal:	pain control  Instructions for follow-up, activity and diet:	follow up with your private plastic surgeon Dr Vo as instructed. Take antibiotics until your plastic surgeon removes your drain Principal Discharge DX:	Incisional hernia  Goal:	Pain control, diet tolerance  Instructions for follow-up, activity and diet:	follow up with Dr Hwang in 1 week, [phone]. Please seek immediate medical attention for fever>100.4, worsening abdominal pain, chest pain, shortness of breath, any adverse changes to health  Secondary Diagnosis:	H/O abdominoplasty  Goal:	pain control  Instructions for follow-up, activity and diet:	follow up with your private plastic surgeon Dr Vo as instructed. Take antibiotics until your plastic surgeon removes your drain Principal Discharge DX:	Incisional hernia  Goal:	Pain control, diet tolerance  Instructions for follow-up, activity and diet:	follow up with Dr Hwang in 1 week, [phone]. Please seek immediate medical attention for fever>100.4, worsening abdominal pain, chest pain, shortness of breath, any adverse changes to health  Secondary Diagnosis:	H/O abdominoplasty  Goal:	pain control  Instructions for follow-up, activity and diet:	follow up with your private plastic surgeon Dr Vo as instructed. Take antibiotics until your plastic surgeon removes your drain

## 2017-05-25 NOTE — DISCHARGE NOTE ADULT - ADDITIONAL INSTRUCTIONS
Follow up with your private plastic surgeon Dr Vo. Follow up with Dr Hwang in 1 week, [phone]. Please seek immediate medical attention for fever>100.4, worsening abdominal pain, chest pain, shortness of breath, any adverse changes to health Follow up with your private plastic surgeon Dr Vo. Follow up with Dr Hwang in 1 week, [phone]. Please seek immediate medical attention for fever>100.4, worsening abdominal pain, chest pain, shortness of breath, any adverse changes to health, use abdominal binder, gauze and tap dressing around LELAND as needed. No heavy lifting, gym , exercise for 4 weeks.

## 2017-05-25 NOTE — DISCHARGE NOTE ADULT - MEDICATION SUMMARY - MEDICATIONS TO TAKE
I will START or STAY ON the medications listed below when I get home from the hospital:    oxyCODONE 5 mg oral tablet  -- 1 tab(s) by mouth every 6 hours, As Needed -for moderate pain MDD:4 tabs  -- Caution federal law prohibits the transfer of this drug to any person other  than the person for whom it was prescribed.  It is very important that you take or use this exactly as directed.  Do not skip doses or discontinue unless directed by your doctor.  May cause drowsiness.  Alcohol may intensify this effect.  Use care when operating dangerous machinery.  This prescription cannot be refilled.  Using more of this medication than prescribed may cause serious breathing problems.    -- Indication: For pain control    Keflex 500 mg oral capsule  -- 1 cap(s) by mouth 4 times a day  -- Finish all this medication unless otherwise directed by prescriber.    -- Indication: For Incisional hernia, without obstruction or gangrene    pantoprazole 40 mg oral delayed release tablet  -- 1 tab(s) by mouth once a day (before a meal)  -- Indication: For antiulcer

## 2017-05-25 NOTE — DISCHARGE NOTE ADULT - PROVIDER TOKENS
Early AM admit  Pt with large [address]  Remains intubated  Neurosurgery, neurology on board  Prognosis grim  Family wants full code at this time    Ree Sen, DO  Internal Medicine, Hospitalist  Pager: [facility] TOKEN:'[id number]',TOKEN:'[id number]'

## 2017-05-25 NOTE — DISCHARGE NOTE ADULT - HOSPITAL COURSE
Pt s/p incisional hernia repair and abdominoplasty. Pt remained stable throughout hospital stay and admission. Pt tolerating diet with resumption of bowel function post operatively. Pt's pain controlled with meds

## 2017-05-26 DIAGNOSIS — R69 ILLNESS, UNSPECIFIED: ICD-10-CM

## 2017-05-26 PROCEDURE — 74177 CT ABD & PELVIS W/CONTRAST: CPT | Mod: 26

## 2017-05-26 RX ORDER — SODIUM CHLORIDE 9 MG/ML
1000 INJECTION INTRAMUSCULAR; INTRAVENOUS; SUBCUTANEOUS
Qty: 0 | Refills: 0 | Status: DISCONTINUED | OUTPATIENT
Start: 2017-05-26 | End: 2017-05-29

## 2017-05-26 RX ADMIN — SODIUM CHLORIDE 100 MILLILITER(S): 9 INJECTION INTRAMUSCULAR; INTRAVENOUS; SUBCUTANEOUS at 05:32

## 2017-05-26 RX ADMIN — HEPARIN SODIUM 5000 UNIT(S): 5000 INJECTION INTRAVENOUS; SUBCUTANEOUS at 14:13

## 2017-05-26 RX ADMIN — PANTOPRAZOLE SODIUM 40 MILLIGRAM(S): 20 TABLET, DELAYED RELEASE ORAL at 10:02

## 2017-05-26 RX ADMIN — Medication 100 MILLIGRAM(S): at 05:31

## 2017-05-26 RX ADMIN — Medication 100 MILLIGRAM(S): at 14:13

## 2017-05-26 RX ADMIN — Medication 100 MILLIGRAM(S): at 21:58

## 2017-05-26 RX ADMIN — HEPARIN SODIUM 5000 UNIT(S): 5000 INJECTION INTRAVENOUS; SUBCUTANEOUS at 05:31

## 2017-05-26 RX ADMIN — HEPARIN SODIUM 5000 UNIT(S): 5000 INJECTION INTRAVENOUS; SUBCUTANEOUS at 21:59

## 2017-05-27 RX ORDER — DOCUSATE SODIUM 100 MG
100 CAPSULE ORAL THREE TIMES A DAY
Qty: 0 | Refills: 0 | Status: DISCONTINUED | OUTPATIENT
Start: 2017-05-27 | End: 2017-05-29

## 2017-05-27 RX ADMIN — Medication 100 MILLIGRAM(S): at 14:54

## 2017-05-27 RX ADMIN — HEPARIN SODIUM 5000 UNIT(S): 5000 INJECTION INTRAVENOUS; SUBCUTANEOUS at 05:52

## 2017-05-27 RX ADMIN — PANTOPRAZOLE SODIUM 40 MILLIGRAM(S): 20 TABLET, DELAYED RELEASE ORAL at 10:11

## 2017-05-27 RX ADMIN — Medication 100 MILLIGRAM(S): at 05:54

## 2017-05-27 RX ADMIN — Medication 100 MILLIGRAM(S): at 22:15

## 2017-05-27 RX ADMIN — Medication 100 MILLIGRAM(S): at 22:16

## 2017-05-27 RX ADMIN — HEPARIN SODIUM 5000 UNIT(S): 5000 INJECTION INTRAVENOUS; SUBCUTANEOUS at 14:54

## 2017-05-28 RX ORDER — PANTOPRAZOLE SODIUM 20 MG/1
40 TABLET, DELAYED RELEASE ORAL
Qty: 0 | Refills: 0 | Status: DISCONTINUED | OUTPATIENT
Start: 2017-05-28 | End: 2017-05-29

## 2017-05-28 RX ADMIN — HEPARIN SODIUM 5000 UNIT(S): 5000 INJECTION INTRAVENOUS; SUBCUTANEOUS at 22:10

## 2017-05-28 RX ADMIN — PANTOPRAZOLE SODIUM 40 MILLIGRAM(S): 20 TABLET, DELAYED RELEASE ORAL at 09:26

## 2017-05-28 RX ADMIN — Medication 100 MILLIGRAM(S): at 06:10

## 2017-05-28 RX ADMIN — Medication 100 MILLIGRAM(S): at 22:10

## 2017-05-28 RX ADMIN — PANTOPRAZOLE SODIUM 40 MILLIGRAM(S): 20 TABLET, DELAYED RELEASE ORAL at 17:36

## 2017-05-28 RX ADMIN — Medication 100 MILLIGRAM(S): at 06:09

## 2017-05-28 RX ADMIN — Medication 100 MILLIGRAM(S): at 14:28

## 2017-05-28 RX ADMIN — HEPARIN SODIUM 5000 UNIT(S): 5000 INJECTION INTRAVENOUS; SUBCUTANEOUS at 14:29

## 2017-05-28 RX ADMIN — SODIUM CHLORIDE 100 MILLILITER(S): 9 INJECTION INTRAMUSCULAR; INTRAVENOUS; SUBCUTANEOUS at 06:00

## 2017-05-28 RX ADMIN — SODIUM CHLORIDE 100 MILLILITER(S): 9 INJECTION INTRAMUSCULAR; INTRAVENOUS; SUBCUTANEOUS at 17:35

## 2017-05-28 RX ADMIN — Medication 100 MILLIGRAM(S): at 12:57

## 2017-05-29 VITALS
DIASTOLIC BLOOD PRESSURE: 70 MMHG | RESPIRATION RATE: 18 BRPM | TEMPERATURE: 98 F | HEART RATE: 818 BPM | SYSTOLIC BLOOD PRESSURE: 118 MMHG | OXYGEN SATURATION: 97 %

## 2017-05-29 RX ORDER — OMEPRAZOLE 10 MG/1
1 CAPSULE, DELAYED RELEASE ORAL
Qty: 30 | Refills: 0
Start: 2017-05-29 | End: 2017-06-28

## 2017-05-29 RX ADMIN — Medication 100 MILLIGRAM(S): at 05:25

## 2017-05-29 RX ADMIN — PANTOPRAZOLE SODIUM 40 MILLIGRAM(S): 20 TABLET, DELAYED RELEASE ORAL at 05:25

## 2017-05-29 RX ADMIN — Medication 100 MILLIGRAM(S): at 05:24

## 2017-05-29 NOTE — PROGRESS NOTE ADULT - PROVIDER SPECIALTY LIST ADULT
Surgery
Plastic Surgery

## 2017-05-29 NOTE — PROGRESS NOTE ADULT - ASSESSMENT
A/P:  S/P incisional hernia repair, CATRACHO  GI/DVT prophylaxis  Pain control  Incentive spirometry  Trial of diet  Diet as tolerated  Monitor bowel function  Pt stable and cleared from surgical standpoint  Discharge home  Pt aware of and agrees with all of the above
A/P:  S/P incisional hernia repair, CATRACHO  GI/DVT prophylaxis  Pain control  Incentive spirometry  Trial of diet  Serial abd exams  Monitor bowel function  Pt stable from surgical standpoint  Pt aware of and agrees with all of the above
A/P:  S/P incisional hernia repair, CATRACHO  NPO, IV hydration  GI/DVT prophylaxis  Pain control  Incentive spirometry  Serial abd exams  F/U labs  Monitored for return of bowel function  Cont current care and meds  Pt stable from surgical standpoint  Pt aware of and agrees with all of the above
A/P:  S/P incisional hernia repair, CATRACHO, abdominoplasty  GI/DVT prophylaxis  Pain control  Incentive spirometry  Diet as tolerated  Monitor bowel function  Cont current care and meds  Pt stable from surgical standpoint  Pt aware of and agrees with all of the above
A/P:  S/P incisional hernia repair, CATRACHO, abdominoplasty  GI/DVT prophylaxis  Pain control  Incentive spirometry  Diet as tolerated  Monitor bowel function  Cont current care and meds  Pt stable from surgical standpoint  Pt aware of and agrees with all of the above
A/P:  S/P incisional hernia repair, CATRACHO, abdominoplasty  GI/DVT prophylaxis  Pain control  Incentive spirometry  Diet as tolerated  Monitor bowel function  Follow up CT abd/pelvis  Pt stable from surgical standpoint  Pt aware of and agrees with all of the above
Patient is a 51y old  Female who presents with a chief complaint of incisional hernia (25 May 2017 13:49) S/p repair of incisional hernia with mesh, revision of abdominoplasty POD 10    diet as tolerated  IVL  DVt  prophylaxis  OOB, ambulate  Abdominal binder  D/C home.
Patient is a 51y old  Female who presents with a chief complaint of ventral hernia, S/P ventral hernia repair abdominal scar revision and revision of abdominoplasty POD POD 4     pain control  OOB, ambulate  Incentive spirometry  Abdominal binder  DVT prophylaxis   Advance diet  Plastic surgery followup  D/C PCA, PO pain meds  Possible D/C home in am.
Patient is a 51y old  Female who presents with a chief complaint of ventral hernia, S/P ventral hernia repair, revision of abdominoplasty POD 5    continue diet  OOB, ambulate  Incentive spirometry  Abdominal binder  Plastic surgery following  Diet as tolerated  DVT GI prophylaxis  Possible D/C home in am.
Cont drain.  Cont binder.  f/u outpt 1 wk.  dispo per Dr. Hwang

## 2017-05-29 NOTE — PROGRESS NOTE ADULT - SUBJECTIVE AND OBJECTIVE BOX
PT seen and examined.  Moving bowels.  Tolerating PO.  Pain well controlled with pain medicine.    AFVSS  LELAND 15, 18mL  Abdomen- incision c/d/i with steristrips.  LEALND serosand, R LELAND removed.  Moderate ecchymosis, appropriately tender, no fluid collection.    cont L LELAND drain.  Cont binder.  Cont to bend at waist.  F/u outpt 1 wk.
CC:Patient is a 51y old  Female who presents with a chief complaint of     Subjective:  Pt seen and examined at bedside with chaperone. Pt is AAOx3, pt in no acute distress. Pt states tolerated incisional pain with meds. Pt denied c/o fever, chills, chest pain, SOB, N/V/D, extremity pain or dysfunction, hemoptysis, hematemesis, hematuria, hematochexia, headache, diplopia, vertigo, dizzyness. Pt states(+) void, (+) ambulation, no bowel function yet. Incentive spirometry encourgaed    ROS:  abdominal incisional pain, otherwise negative ROS    Vital Signs Last 24 Hrs  T(C): 36.9, Max: 37 (05-19 @ 17:47)  T(F): 98.4, Max: 98.6 (05-19 @ 17:47)  HR: 68 (61 - 86)  BP: 102/66 (102/66 - 132/74)  BP(mean): --  RR: 18 (11 - 112)  SpO2: 99% (97% - 100%)    Labs:                        11.7   11.5  )-----------( 182      ( 20 May 2017 08:02 )             36.6     CBC Full  -  ( 20 May 2017 08:02 )  WBC Count : 11.5 K/uL  Hemoglobin : 11.7 g/dL  Hematocrit : 36.6 %  Platelet Count - Automated : 182 K/uL  Mean Cell Volume : 90.0 fl  Mean Cell Hemoglobin : 28.7 pg  Mean Cell Hemoglobin Concentration : 31.8 gm/dL  Auto Neutrophil # : 9.3 K/uL  Auto Lymphocyte # : 1.5 K/uL  Auto Monocyte # : 0.6 K/uL  Auto Eosinophil # : 0.0 K/uL  Auto Basophil # : 0.0 K/uL  Auto Neutrophil % : 81.2 %  Auto Lymphocyte % : 13.2 %  Auto Monocyte % : 5.4 %  Auto Eosinophil % : 0.0 %  Auto Basophil % : 0.2 %    05-20    142  |  108  |  8   ----------------------------<  133<H>  3.8   |  24  |  0.66    Ca    7.9<L>      20 May 2017 08:02  Mg     1.9     05-19        PT/INR - ( 19 May 2017 21:19 )   PT: 11.7 sec;   INR: 1.08 ratio         PTT - ( 19 May 2017 18:23 )  PTT:34.8 sec      Meds:  sodium chloride 0.9%. 1000milliLiter(s) IV Continuous <Continuous>  HYDROmorphone  Injectable 0.3milliGRAM(s) IV Push every 10 minutes PRN  oxyCODONE IR 5milliGRAM(s) Oral every 4 hours PRN  heparin  Injectable 5000Unit(s) SubCutaneous every 8 hours  HYDROmorphone PCA (1 mG/mL) 30milliLiter(s) PCA Continuous PCA Continuous  naloxone Injectable 0.1milliGRAM(s) IV Push every 3 minutes PRN  ondansetron Injectable 4milliGRAM(s) IV Push every 6 hours PRN  lactated ringers. 1000milliLiter(s) IV Continuous <Continuous>  ondansetron Injectable 4milliGRAM(s) IV Push every 6 hours PRN  ceFAZolin   IVPB 2000milliGRAM(s) IV Intermittent every 8 hours  diphenhydrAMINE   Capsule 50milliGRAM(s) Oral every 6 hours PRN      Radiology:      Physical exam:  Pt is aaox3  Pt in no acute distress  Resp: CTAB  CVS: S1S2(+)  ABD: bowel sounds hypoactive, soft, non distended, no rebound, no guarding, no rigidity, no skin changes to exam. Incision site is clean, dry, intact, no cellulitis, no d/c, no purulence, no fluctuance. (+) appropriate incisional tenderness to exam  EXT: no calf tenderness or edema to exam b/l, on VTE prophylaxis  Skin: no adverse skin changes to exam
CC:Patient is a 51y old  Female who presents with a chief complaint of     Subjective:  Pt seen and examined at bedside with chaperone. Pt is AAOx3, pt in no acute distress. Pt states tolerated incisional pain with meds. Pt denied c/o fever, chills, chest pain, SOB, N/V/D, extremity pain or dysfunction, hemoptysis, hematemesis, hematuria, hematochexia, headache, diplopia, vertigo, dizzyness. Pt tolerating diet, (+) void, (+) ambulation, (+) bowel function of flatus    ROS:  abd incisional pain, otherwise negative ROS    Vital Signs Last 24 Hrs  T(C): 36.8, Max: 37.4 (05-21 @ 16:41)  T(F): 98.3, Max: 99.3 (05-21 @ 16:41)  HR: 57 (57 - 81)  BP: 118/61 (110/58 - 153/77)  BP(mean): --  RR: 18 (16 - 18)  SpO2: 100% (97% - 100%)    Labs:        Meds:  heparin  Injectable 5000Unit(s) SubCutaneous every 8 hours  HYDROmorphone PCA (1 mG/mL) 30milliLiter(s) PCA Continuous PCA Continuous  naloxone Injectable 0.1milliGRAM(s) IV Push every 3 minutes PRN  ondansetron Injectable 4milliGRAM(s) IV Push every 6 hours PRN  ondansetron Injectable 4milliGRAM(s) IV Push every 6 hours PRN  ceFAZolin   IVPB 2000milliGRAM(s) IV Intermittent every 8 hours  diphenhydrAMINE   Capsule 50milliGRAM(s) Oral every 6 hours PRN  pantoprazole  Injectable 40milliGRAM(s) IV Push every 12 hours  dextrose 5% + sodium chloride 0.45% with potassium chloride 20 mEq/L 1000milliLiter(s) IV Continuous <Continuous>      Radiology:      Physical exam:  Pt is aaox3  Pt in no acute distress  Resp: CTAB  CVS: S1S2(+)  ABD: bowel sounds (+), soft, non distended, no rebound, no guarding, no rigidity, no skin changes to exam. Incision site is clean, dry, intact, no cellulitis, no d/c, no purulence, no fluctuance. (+) appropriate incisional tenderness to exam  EXT: no calf tenderness or edema to exam b/l, on VTE prophylaxis  Skin: no adverse skin changes to exam
CC:Patient is a 51y old  Female who presents with a chief complaint of     Subjective:  Pt seen and examined at bedside with chaperone. Pt is AAOx3, pt in no acute distress. Pt states tolerated incisional pain with meds. Pt denied c/o fever, chills, chest pain, SOB, N/V/D, extremity pain or dysfunction, hemoptysis, hematemesis, hematuria, hematochexia, headache, diplopia, vertigo, dizzyness. Pt tolerating diet, (+) void, (+) ambulation, (+) bowel function of flatus and bm    ROS:  abd incisional pain, otherwise negative ROS      Vital Signs Last 24 Hrs  T(C): 36.3, Max: 36.4 (05-24 @ 16:57)  T(F): 97.4, Max: 97.5 (05-24 @ 16:57)  HR: 72 (57 - 72)  BP: 134/76 (124/72 - 134/88)  BP(mean): --  RR: 16 (16 - 17)  SpO2: 100% (100% - 100%)    Labs:        Meds:  heparin  Injectable 5000Unit(s) SubCutaneous every 8 hours  naloxone Injectable 0.1milliGRAM(s) IV Push every 3 minutes PRN  ondansetron Injectable 4milliGRAM(s) IV Push every 6 hours PRN  ondansetron Injectable 4milliGRAM(s) IV Push every 6 hours PRN  ceFAZolin   IVPB 2000milliGRAM(s) IV Intermittent every 8 hours  diphenhydrAMINE   Capsule 50milliGRAM(s) Oral every 6 hours PRN  hydrOXYzine hydrochloride 25milliGRAM(s) Oral every 6 hours PRN  oxyCODONE  5 mG/acetaminophen 325 mG 1Tablet(s) Oral every 4 hours PRN  HYDROmorphone  Injectable 1milliGRAM(s) IV Push every 6 hours PRN  acetaminophen   Tablet. 650milliGRAM(s) Oral every 4 hours PRN  oxyCODONE IR 5milliGRAM(s) Oral every 4 hours PRN  pantoprazole    Tablet 40milliGRAM(s) Oral before breakfast  bisacodyl Suppository 10milliGRAM(s) Rectal once      Radiology:      Physical exam:  Pt is aaox3  Pt in no acute distress  Resp: CTAB  CVS: S1S2(+)  ABD: bowel sounds (+), soft, non distended, no rebound, no guarding, no rigidity, no skin changes to exam. Incision site is clean, dry, intact, no cellulitis, no d/c, no purulence, no fluctuance. (+) appropriate incisional tenderness to exam, drain demonstrates appropriate serosanguinous output  EXT: no calf tenderness or edema to exam b/l, on VTE prophylaxis  Skin: no adverse skin changes to exam
CC:Patient is a 51y old  Female who presents with a chief complaint of incisional hernia (25 May 2017 13:49)      Subjective:  Pt seen and examined at bedside with chaperone. Pt is AAOx3, pt in no acute distress. Pt denied c/o fever, chills, chest pain, SOB, N/V/D, extremity pain or dysfunction, hemoptysis, hematemesis, hematuria, hematochexia, headache, diplopia, vertigo, dizzyness. Pt tolerating diet, (+) void, (+) ambulation, (+) bowel function, (+) incisional pain control with meds    ROS:  incisional pain, otherwise negative ROS    Vital Signs Last 24 Hrs  T(C): 36.5, Max: 36.7 (05-27 @ 17:06)  T(F): 97.7, Max: 98.1 (05-27 @ 17:06)  HR: 109 (67 - 109)  BP: 123/47 (116/- - 123/78)  BP(mean): --  RR: 18 (18 - 18)  SpO2: 98% (98% - 100%)    Labs:        Meds:  heparin  Injectable 5000Unit(s) SubCutaneous every 8 hours  naloxone Injectable 0.1milliGRAM(s) IV Push every 3 minutes PRN  ondansetron Injectable 4milliGRAM(s) IV Push every 6 hours PRN  ondansetron Injectable 4milliGRAM(s) IV Push every 6 hours PRN  ceFAZolin   IVPB 2000milliGRAM(s) IV Intermittent every 8 hours  diphenhydrAMINE   Capsule 50milliGRAM(s) Oral every 6 hours PRN  hydrOXYzine hydrochloride 25milliGRAM(s) Oral every 6 hours PRN  oxyCODONE  5 mG/acetaminophen 325 mG 1Tablet(s) Oral every 4 hours PRN  HYDROmorphone  Injectable 1milliGRAM(s) IV Push every 6 hours PRN  acetaminophen   Tablet. 650milliGRAM(s) Oral every 4 hours PRN  oxyCODONE IR 5milliGRAM(s) Oral every 4 hours PRN  sodium chloride 0.9%. 1000milliLiter(s) IV Continuous <Continuous>  docusate sodium 100milliGRAM(s) Oral three times a day  pantoprazole    Tablet 40milliGRAM(s) Oral two times a day before meals      Radiology:      Physical exam:  Pt is aaox3  Pt in no acute distress  Resp: CTAB  CVS: S1S2(+)  ABD: bowel sounds (+), soft, non distended, no rebound, no guarding, no rigidity, no skin changes to exam. Incision site is clean, dry, intact, no cellulitis, no d/c, no purulence, no fluctuance. (+) appropriate incisional tenderness to exam. Drain demosntrates appropriate serosanguinous output  EXT: no calf tenderness or edema to exam b/l, on VTE prophylaxis  Skin: no adverse skin changes to exam
CC:Patient is a 51y old  Female who presents with a chief complaint of incisional hernia (25 May 2017 13:49)      Subjective:  Pt seen and examined at bedside with chaperone. Pt is AAOx3, pt in no acute distress. Pt states tolerated incisional pain with meds. Pt denied c/o fever, chills, chest pain, SOB, N/V/D, extremity pain or dysfunction, hemoptysis, hematemesis, hematuria, hematochexia, headache, diplopia, vertigo, dizzyness. Pt tolerating diet, (+) void, (+) ambulation, (+) bowel function of flatus     ROS:  abd incisional pain, otherwise negative ROS    Vital Signs Last 24 Hrs  T(C): 36.9, Max: 36.9 (05-27 @ 12:03)  T(F): 98.5, Max: 98.5 (05-27 @ 12:03)  HR: 66 (60 - 68)  BP: 95/43 (95/43 - 129/73)  BP(mean): --  RR: 18 (18 - 19)  SpO2: 99% (99% - 100%)    Labs:                        12.2   7.6   )-----------( 227      ( 25 May 2017 19:23 )             36.5     CBC Full  -  ( 25 May 2017 19:23 )  WBC Count : 7.6 K/uL  Hemoglobin : 12.2 g/dL  Hematocrit : 36.5 %  Platelet Count - Automated : 227 K/uL  Mean Cell Volume : 86.1 fl  Mean Cell Hemoglobin : 28.7 pg  Mean Cell Hemoglobin Concentration : 33.3 gm/dL  Auto Neutrophil # : x  Auto Lymphocyte # : x  Auto Monocyte # : x  Auto Eosinophil # : x  Auto Basophil # : x  Auto Neutrophil % : x  Auto Lymphocyte % : x  Auto Monocyte % : x  Auto Eosinophil % : x  Auto Basophil % : x    05-25    140  |  102  |  9   ----------------------------<  111<H>  3.8   |  29  |  0.72    Ca    9.2      25 May 2017 19:23              Meds:  heparin  Injectable 5000Unit(s) SubCutaneous every 8 hours  naloxone Injectable 0.1milliGRAM(s) IV Push every 3 minutes PRN  ondansetron Injectable 4milliGRAM(s) IV Push every 6 hours PRN  ondansetron Injectable 4milliGRAM(s) IV Push every 6 hours PRN  ceFAZolin   IVPB 2000milliGRAM(s) IV Intermittent every 8 hours  diphenhydrAMINE   Capsule 50milliGRAM(s) Oral every 6 hours PRN  hydrOXYzine hydrochloride 25milliGRAM(s) Oral every 6 hours PRN  oxyCODONE  5 mG/acetaminophen 325 mG 1Tablet(s) Oral every 4 hours PRN  HYDROmorphone  Injectable 1milliGRAM(s) IV Push every 6 hours PRN  acetaminophen   Tablet. 650milliGRAM(s) Oral every 4 hours PRN  oxyCODONE IR 5milliGRAM(s) Oral every 4 hours PRN  pantoprazole  Injectable 40milliGRAM(s) IV Push daily  sodium chloride 0.9%. 1000milliLiter(s) IV Continuous <Continuous>  docusate sodium 100milliGRAM(s) Oral three times a day      Radiology:  CT abd/pelvis 5/26/17 reviewed    Physical exam:  Pt is aaox3  Pt in no acute distress  Resp: CTAB  CVS: S1S2(+)  ABD: bowel sounds (+), soft, non distended, no rebound, no guarding, no rigidity, no skin changes to exam. Incision site is clean, dry, intact, no cellulitis, no d/c, no purulence, no fluctuance. (+) appropriate incisional tenderness to exam. Drain demosntrates appropriate serosanguinous output  EXT: no calf tenderness or edema to exam b/l, on VTE prophylaxis  Skin: no adverse skin changes to exam
CC:Patient is a 51y old  Female who presents with a chief complaint of incisional hernia (25 May 2017 13:49)      Subjective:  Pt seen and examined at bedside with chaperone. Pt is AAOx3, pt in no acute distress. Pt states tolerated incisional pain with meds. Pt denied c/o fever, chills, chest pain, SOB, N/V/D, extremity pain or dysfunction, hemoptysis, hematemesis, hematuria, hematochexia, headache, diplopia, vertigo, dizzyness. Pt tolerating diet, (+) void, (+) ambulation, (+) bowel function of flatus and bm    ROS:  abd incisional pain, otherwise negative ROS      Vital Signs Last 24 Hrs  T(C): 36.6, Max: 36.6 (05-26 @ 05:29)  T(F): 97.8, Max: 97.8 (05-26 @ 05:29)  HR: 54 (54 - 59)  BP: 118/67 (109/63 - 143/71)  BP(mean): --  RR: 18 (17 - 18)  SpO2: 100% (96% - 100%)    Labs:                        12.2   7.6   )-----------( 227      ( 25 May 2017 19:23 )             36.5     CBC Full  -  ( 25 May 2017 19:23 )  WBC Count : 7.6 K/uL  Hemoglobin : 12.2 g/dL  Hematocrit : 36.5 %  Platelet Count - Automated : 227 K/uL  Mean Cell Volume : 86.1 fl  Mean Cell Hemoglobin : 28.7 pg  Mean Cell Hemoglobin Concentration : 33.3 gm/dL  Auto Neutrophil # : x  Auto Lymphocyte # : x  Auto Monocyte # : x  Auto Eosinophil # : x  Auto Basophil # : x  Auto Neutrophil % : x  Auto Lymphocyte % : x  Auto Monocyte % : x  Auto Eosinophil % : x  Auto Basophil % : x    05-25    140  |  102  |  9   ----------------------------<  111<H>  3.8   |  29  |  0.72    Ca    9.2      25 May 2017 19:23              Meds:  heparin  Injectable 5000Unit(s) SubCutaneous every 8 hours  naloxone Injectable 0.1milliGRAM(s) IV Push every 3 minutes PRN  ondansetron Injectable 4milliGRAM(s) IV Push every 6 hours PRN  ondansetron Injectable 4milliGRAM(s) IV Push every 6 hours PRN  ceFAZolin   IVPB 2000milliGRAM(s) IV Intermittent every 8 hours  diphenhydrAMINE   Capsule 50milliGRAM(s) Oral every 6 hours PRN  hydrOXYzine hydrochloride 25milliGRAM(s) Oral every 6 hours PRN  oxyCODONE  5 mG/acetaminophen 325 mG 1Tablet(s) Oral every 4 hours PRN  HYDROmorphone  Injectable 1milliGRAM(s) IV Push every 6 hours PRN  acetaminophen   Tablet. 650milliGRAM(s) Oral every 4 hours PRN  oxyCODONE IR 5milliGRAM(s) Oral every 4 hours PRN  sodium chloride 0.9%. 1000milliLiter(s) IV Continuous <Continuous>  pantoprazole  Injectable 40milliGRAM(s) IV Push daily      Radiology:  pending CT abd/pelvis    Physical exam:  Pt is aaox3  Pt in no acute distress  Resp: CTAB  CVS: S1S2(+)  ABD: bowel sounds (+), soft, non distended, no rebound, no guarding, no rigidity, no skin changes to exam. Incision site is clean, dry, intact, no cellulitis, no d/c, no purulence, no fluctuance. (+) appropriate incisional tenderness to exam. Drain demosntrates appropriate serosanguinous output  EXT: no calf tenderness or edema to exam b/l, on VTE prophylaxis  Skin: no adverse skin changes to exam
Called to see pt for vomiting. Pt was discharged earlier but had not left.  She sates that she was having epigastric pain all day and then vomited now.  Pt ate a diet today. Positive flatus.      ICU Vital Signs Last 24 Hrs  T(C): 36.1, Max: 36.3 (05-25 @ 04:18)  T(F): 97, Max: 97.4 (05-25 @ 04:18)  HR: 59 (57 - 72)  BP: 143/71 (124/72 - 143/71)  BP(mean): --  ABP: --  ABP(mean): --  RR: 17 (16 - 17)  SpO2: 97% (97% - 100%)      PE: appears lethargic    ABD: distended, post BS, tenderness in the epigastric area, pt vomited during exam    A/p:  post op vomiting, lethargy  -abd xray  -  check labs  Protonix npo,ivf,,zofran    message left with Dr. Duarte
Patient is a 51y old  Female who presents with a chief complaint of incisional hernia (25 May 2017 13:49) S/p repair of incisional hernia with mesh, revision of abdominoplasty POD 10      HPI:  Doing well, pain controlled no fever , no nausea, no vomiting, tolerating diet, passing gas.      Vital Signs Last 24 Hrs  T(C): 36.7, Max: 36.8 (05-28 @ 16:48)  T(F): 98, Max: 98.3 (05-28 @ 16:48)  HR: 818 (69 - 818)  BP: 118/70 (103/68 - 123/47)  BP(mean): --  RR: 18 (18 - 18)  SpO2: 97% (97% - 98%)      I&O's Summary    I & Os for current day (as of 29 May 2017 10:40)  =============================================  IN: 240 ml / OUT: 5 ml / NET: 235 ml      PHYSICAL EXAM:      Constitutional: NAD    General:  AOx3    Respiratory:  CTABL    Cardiovascular: S1+S2+0    Gastrointestinal : Abdomen soft, non distended, NT, dressing and incision CDI.LELAND serous.    Extremities: NV intact    Neurological: No focal deficit.          Labs:
Patient is a 51y old  Female who presents with a chief complaint of ventral hernia, S/P ventral hernia repair abdominal scar revision and revision of abdominoplasty POD POD 4     HPI:  Pt seen and examined, pain controlled with meds, no nausea no fever , tolerating CLD, passing gas.      Vital Signs Last 24 Hrs  T(C): 36.6, Max: 36.7 (05-22 @ 16:47)  T(F): 97.8, Max: 98 (05-22 @ 16:47)  HR: 58 (58 - 68)  BP: 126/82 (104/72 - 126/82)  BP(mean): --  RR: 19 (18 - 19)  SpO2: 100% (100% - 100%)      I&O's Summary  I & Os for 24h ending 23 May 2017 07:00  =============================================  IN: 1000 ml / OUT: 33 ml / NET: 967 ml    I & Os for current day (as of 23 May 2017 15:22)  =============================================  IN: 360 ml / OUT: 600 ml / NET: -240 ml      PHYSICAL EXAM:      Constitutional: NAD    General:  AOx3    Respiratory:  CTABL    Cardiovascular: S1+S2+0    Gastrointestinal : Abdomen soft, non distended, NT, dressing and incision CDI. LELAND serosanguinous    Extremities: NV intact    Neurological: No focal deficit.          Labs:
Patient is a 51y old  Female who presents with a chief complaint of ventral hernia, S/P ventral hernia repair, revision of abdominoplasty POD 5    HPI:  Pt seen and examined, feeling overall better, C/O epigastric pain on drinking water, not with food. No fever, no nausea, passing gas, no BM. No SOB.      Vital Signs Last 24 Hrs  T(C): 36.4, Max: 36.7 (05-24 @ 05:25)  T(F): 97.5, Max: 98.1 (05-24 @ 05:25)  HR: 59 (59 - 64)  BP: 134/88 (13/52 - 134/88)  BP(mean): --  RR: 17 (16 - 17)  SpO2: 100% (100% - 100%)      I&O's Summary  I & Os for 24h ending 24 May 2017 07:00  =============================================  IN: 1780 ml / OUT: 615 ml / NET: 1165 ml    I & Os for current day (as of 25 May 2017 00:20)  =============================================  IN: 240 ml / OUT: 10 ml / NET: 230 ml      PHYSICAL EXAM:      Constitutional: NAD    General:  AOx3    Respiratory:  CTABL    Cardiovascular: S1+S2+0    Gastrointestinal : Abdomen soft, non distended, NT, dressing and incision CDI.LELAND serosanguinous    Extremities: NV intact    Neurological: No focal deficit.          Labs:
Patient with epigastric pain, nausea.    Vital Signs Last 24 Hrs  T(C): 36.9, Max: 37.3 (05-20 @ 18:38)  T(F): 98.4, Max: 99.2 (05-20 @ 18:38)  HR: 80 (79 - 95)  BP: 137/85 (110/55 - 137/85)  BP(mean): --  RR: 16 (16 - 19)  SpO2: 98% (98% - 100%)    Lab Results:  CBC  CBC Full  -  ( 20 May 2017 08:02 )  WBC Count : 11.5 K/uL  Hemoglobin : 11.7 g/dL  Hematocrit : 36.6 %  Platelet Count - Automated : 182 K/uL  Mean Cell Volume : 90.0 fl  Mean Cell Hemoglobin : 28.7 pg  Mean Cell Hemoglobin Concentration : 31.8 gm/dL  Auto Neutrophil # : 9.3 K/uL  Auto Lymphocyte # : 1.5 K/uL  Auto Monocyte # : 0.6 K/uL  Auto Eosinophil # : 0.0 K/uL  Auto Basophil # : 0.0 K/uL  Auto Neutrophil % : 81.2 %  Auto Lymphocyte % : 13.2 %  Auto Monocyte % : 5.4 %  Auto Eosinophil % : 0.0 %  Auto Basophil % : 0.2 %    .		Differential:	[] Automated		[] Manual  Chemistry                        11.7   11.5  )-----------( 182      ( 20 May 2017 08:02 )             36.6     05-20    142  |  108  |  8   ----------------------------<  133<H>  3.8   |  24  |  0.66    Ca    7.9<L>      20 May 2017 08:02  Mg     1.9     05-19        PT/INR - ( 19 May 2017 21:19 )   PT: 11.7 sec;   INR: 1.08 ratio         PTT - ( 19 May 2017 18:23 )  PTT:34.8 sec

## 2017-06-01 DIAGNOSIS — F41.9 ANXIETY DISORDER, UNSPECIFIED: ICD-10-CM

## 2017-06-01 DIAGNOSIS — F32.9 MAJOR DEPRESSIVE DISORDER, SINGLE EPISODE, UNSPECIFIED: ICD-10-CM

## 2017-06-01 DIAGNOSIS — K43.2 INCISIONAL HERNIA WITHOUT OBSTRUCTION OR GANGRENE: ICD-10-CM

## 2017-06-01 DIAGNOSIS — E66.9 OBESITY, UNSPECIFIED: ICD-10-CM

## 2017-06-01 DIAGNOSIS — K21.9 GASTRO-ESOPHAGEAL REFLUX DISEASE WITHOUT ESOPHAGITIS: ICD-10-CM

## 2017-06-01 DIAGNOSIS — L91.0 HYPERTROPHIC SCAR: ICD-10-CM

## 2017-06-01 DIAGNOSIS — E11.9 TYPE 2 DIABETES MELLITUS WITHOUT COMPLICATIONS: ICD-10-CM

## 2017-06-01 PROCEDURE — G9001: CPT

## 2017-06-22 RX ORDER — OMEPRAZOLE 10 MG/1
1 CAPSULE, DELAYED RELEASE ORAL
Qty: 30 | Refills: 0
Start: 2017-06-22 | End: 2017-07-22

## 2017-11-02 ENCOUNTER — APPOINTMENT (OUTPATIENT)
Dept: MAMMOGRAPHY | Facility: IMAGING CENTER | Age: 52
End: 2017-11-02
Payer: MEDICAID

## 2017-11-02 ENCOUNTER — OUTPATIENT (OUTPATIENT)
Dept: OUTPATIENT SERVICES | Facility: HOSPITAL | Age: 52
LOS: 1 days | End: 2017-11-02
Payer: MEDICAID

## 2017-11-02 ENCOUNTER — APPOINTMENT (OUTPATIENT)
Dept: ULTRASOUND IMAGING | Facility: IMAGING CENTER | Age: 52
End: 2017-11-02
Payer: MEDICAID

## 2017-11-02 DIAGNOSIS — Z00.00 ENCOUNTER FOR GENERAL ADULT MEDICAL EXAMINATION WITHOUT ABNORMAL FINDINGS: ICD-10-CM

## 2017-11-02 DIAGNOSIS — N39.3 STRESS INCONTINENCE (FEMALE) (MALE): Chronic | ICD-10-CM

## 2017-11-02 DIAGNOSIS — Z98.890 OTHER SPECIFIED POSTPROCEDURAL STATES: Chronic | ICD-10-CM

## 2017-11-02 PROCEDURE — 77066 DX MAMMO INCL CAD BI: CPT

## 2017-11-02 PROCEDURE — 76642 ULTRASOUND BREAST LIMITED: CPT | Mod: 26,RT

## 2017-11-02 PROCEDURE — G0279: CPT | Mod: 26

## 2017-11-02 PROCEDURE — G0204: CPT | Mod: 26

## 2017-11-02 PROCEDURE — G0279: CPT

## 2017-11-02 PROCEDURE — 76642 ULTRASOUND BREAST LIMITED: CPT

## 2017-11-24 ENCOUNTER — RESULT REVIEW (OUTPATIENT)
Age: 52
End: 2017-11-24

## 2017-11-24 ENCOUNTER — APPOINTMENT (OUTPATIENT)
Dept: MAMMOGRAPHY | Facility: IMAGING CENTER | Age: 52
End: 2017-11-24
Payer: MEDICAID

## 2017-11-24 ENCOUNTER — OUTPATIENT (OUTPATIENT)
Dept: OUTPATIENT SERVICES | Facility: HOSPITAL | Age: 52
LOS: 1 days | End: 2017-11-24
Payer: MEDICAID

## 2017-11-24 DIAGNOSIS — N39.3 STRESS INCONTINENCE (FEMALE) (MALE): Chronic | ICD-10-CM

## 2017-11-24 DIAGNOSIS — Z98.890 OTHER SPECIFIED POSTPROCEDURAL STATES: Chronic | ICD-10-CM

## 2017-11-24 DIAGNOSIS — Z00.00 ENCOUNTER FOR GENERAL ADULT MEDICAL EXAMINATION WITHOUT ABNORMAL FINDINGS: ICD-10-CM

## 2017-11-24 PROCEDURE — 77065 DX MAMMO INCL CAD UNI: CPT

## 2017-11-24 PROCEDURE — A4648: CPT

## 2017-11-24 PROCEDURE — 19081 BX BREAST 1ST LESION STRTCTC: CPT | Mod: LT

## 2017-11-24 PROCEDURE — 88305 TISSUE EXAM BY PATHOLOGIST: CPT

## 2017-11-24 PROCEDURE — 19081 BX BREAST 1ST LESION STRTCTC: CPT

## 2017-11-24 PROCEDURE — 88305 TISSUE EXAM BY PATHOLOGIST: CPT | Mod: 26

## 2017-11-24 PROCEDURE — G0206: CPT | Mod: 26,LT

## 2017-11-27 LAB — SURGICAL PATHOLOGY STUDY: SIGNIFICANT CHANGE UP

## 2017-11-28 DIAGNOSIS — R92.8 OTHER ABNORMAL AND INCONCLUSIVE FINDINGS ON DIAGNOSTIC IMAGING OF BREAST: ICD-10-CM

## 2017-11-28 DIAGNOSIS — R92.1 MAMMOGRAPHIC CALCIFICATION FOUND ON DIAGNOSTIC IMAGING OF BREAST: ICD-10-CM

## 2018-03-19 ENCOUNTER — EMERGENCY (EMERGENCY)
Facility: HOSPITAL | Age: 53
LOS: 0 days | Discharge: HOME | End: 2018-03-19
Attending: EMERGENCY MEDICINE | Admitting: EMERGENCY MEDICINE
Payer: MEDICAID

## 2018-03-19 VITALS
TEMPERATURE: 98 F | OXYGEN SATURATION: 100 % | HEART RATE: 72 BPM | SYSTOLIC BLOOD PRESSURE: 142 MMHG | DIASTOLIC BLOOD PRESSURE: 82 MMHG

## 2018-03-19 VITALS — WEIGHT: 175.05 LBS

## 2018-03-19 DIAGNOSIS — Z98.890 OTHER SPECIFIED POSTPROCEDURAL STATES: Chronic | ICD-10-CM

## 2018-03-19 DIAGNOSIS — F41.9 ANXIETY DISORDER, UNSPECIFIED: ICD-10-CM

## 2018-03-19 DIAGNOSIS — N39.3 STRESS INCONTINENCE (FEMALE) (MALE): Chronic | ICD-10-CM

## 2018-03-19 DIAGNOSIS — M51.37 OTHER INTERVERTEBRAL DISC DEGENERATION, LUMBOSACRAL REGION: ICD-10-CM

## 2018-03-19 DIAGNOSIS — M54.32 SCIATICA, LEFT SIDE: ICD-10-CM

## 2018-03-19 DIAGNOSIS — F32.9 MAJOR DEPRESSIVE DISORDER, SINGLE EPISODE, UNSPECIFIED: ICD-10-CM

## 2018-03-19 DIAGNOSIS — E11.9 TYPE 2 DIABETES MELLITUS WITHOUT COMPLICATIONS: ICD-10-CM

## 2018-03-19 PROCEDURE — 72192 CT PELVIS W/O DYE: CPT | Mod: 26

## 2018-03-19 PROCEDURE — 99284 EMERGENCY DEPT VISIT MOD MDM: CPT

## 2018-03-19 PROCEDURE — 72131 CT LUMBAR SPINE W/O DYE: CPT | Mod: 26

## 2018-03-19 RX ORDER — DEXAMETHASONE 0.5 MG/5ML
10 ELIXIR ORAL ONCE
Qty: 0 | Refills: 0 | Status: COMPLETED | OUTPATIENT
Start: 2018-03-19 | End: 2018-03-19

## 2018-03-19 RX ORDER — LIDOCAINE 4 G/100G
1 CREAM TOPICAL ONCE
Qty: 0 | Refills: 0 | Status: COMPLETED | OUTPATIENT
Start: 2018-03-19 | End: 2018-03-19

## 2018-03-19 RX ORDER — CYCLOBENZAPRINE HYDROCHLORIDE 10 MG/1
10 TABLET, FILM COATED ORAL ONCE
Qty: 0 | Refills: 0 | Status: COMPLETED | OUTPATIENT
Start: 2018-03-19 | End: 2018-03-19

## 2018-03-19 RX ORDER — OXYCODONE AND ACETAMINOPHEN 5; 325 MG/1; MG/1
1 TABLET ORAL ONCE
Qty: 0 | Refills: 0 | Status: DISCONTINUED | OUTPATIENT
Start: 2018-03-19 | End: 2018-03-19

## 2018-03-19 RX ORDER — LIDOCAINE 4 G/100G
1 CREAM TOPICAL
Qty: 7 | Refills: 0
Start: 2018-03-19 | End: 2018-03-25

## 2018-03-19 RX ORDER — CYCLOBENZAPRINE HYDROCHLORIDE 10 MG/1
1 TABLET, FILM COATED ORAL
Qty: 15 | Refills: 0
Start: 2018-03-19 | End: 2018-03-23

## 2018-03-19 RX ADMIN — CYCLOBENZAPRINE HYDROCHLORIDE 10 MILLIGRAM(S): 10 TABLET, FILM COATED ORAL at 18:06

## 2018-03-19 RX ADMIN — LIDOCAINE 1 PATCH: 4 CREAM TOPICAL at 19:07

## 2018-03-19 RX ADMIN — Medication 10 MILLIGRAM(S): at 18:07

## 2018-03-19 RX ADMIN — OXYCODONE AND ACETAMINOPHEN 1 TABLET(S): 5; 325 TABLET ORAL at 18:06

## 2018-03-19 NOTE — ED ADULT NURSE NOTE - PSH
Female stress incontinence  Interstem insertion and removed 1/2017  H/O abdominoplasty  9/2016 - [facility]  H/O: hysterectomy  2007  Right knee arthroscopy 1998, left knee arthroscopy 2005 1998  S/P Ankle Ligament Repair  Left-8/20/10  S/P MARIYA-BSO  2007  Tubal Ligation (ICD9 V26.51) 1997 1997  Urinary incontinence  vaginal sling in 2011 and removed 1/2017  Uterine artery embolization, Vaginal sling 01/2007

## 2018-03-19 NOTE — ED STATDOCS - ATTENDING CONTRIBUTION TO CARE
I, Graham Dooley MD,  performed the initial face to face bedside interview with this patient regarding history of present illness, review of symptoms and relevant past medical, social and family history.  I completed an independent physical examination.  I was the initial provider who evaluated this patient. I have signed out the follow up of any pending tests (i.e. labs, radiological studies) to the ACP.  I have communicated the patient’s plan of care and disposition with the ACP.  The history, relevant review of systems, past medical and surgical history, medical decision making, and physical examination was documented by the scribe in my presence and I attest to the accuracy of the documentation.

## 2018-03-19 NOTE — ED STATDOCS - OBJECTIVE STATEMENT
51 y/o F w/ pmhx of DM, pshx of interstem insertion, presents to ED c/o left buttock pain, radiating down to left calf. Pt took Naproxen w/o relief. Pt used heat pack yesterday on left buttock. Pt reports pain started suddenly w/ no known initiating factor. Pt denies redness/swelling to leg, bladder or bowel dysfunction. No hx of back pain or sciatica. Denies falls or injuries. 53 y/o F w/ pmhx of DM, pshx of STIM insertion, presents to ED c/o left buttock pain, radiating down to left calf. Pt took Naproxen w/o relief. Pt used heat pack yesterday on left buttock. Pt reports pain started suddenly w/ no known initiating factor. Pt denies redness/swelling to leg, bladder or bowel dysfunction. No hx of back pain or sciatica. Denies falls or injuries.

## 2018-03-19 NOTE — ED STATDOCS - PSH
Female stress incontinence  Interstem insertion and removed 1/2017  H/O abdominoplasty  9/2016 - [facility]  H/O: hysterectomy  2007  Right knee arthroscopy 1998, left knee arthroscopy 2005 1998  S/P Ankle Ligament Repair  Left-8/20/10  S/P MARIYA-BSO  2007  Tubal Ligation (ICD9 V26.51) 1997 1997  Urinary incontinence  vaginal sling in 2011 and removed 1/2017  Uterine artery embolization, Vaginal sling 01/2007

## 2018-03-19 NOTE — ED STATDOCS - PROGRESS NOTE DETAILS
51 yo female presents with left buttocks pain since last week. Pt states the pain gradually worsened. Has been trying motrin and tylenol without relief. Pain radiates to the left calf. Denies incontinence of urine or stool, numbness to the legs. -Charlie Mantilla PA-C

## 2018-03-19 NOTE — ED ADULT TRIAGE NOTE - CHIEF COMPLAINT QUOTE
Pt presents to ED c/o pain in the center of her left buttock that radiates down her left leg. Pt denies redness/swelling to leg. Pt denies bladder and bowel dysfunction.

## 2018-03-19 NOTE — ED ADULT NURSE NOTE - OBJECTIVE STATEMENT
pt presents to ED with L buttock pain radiating down LLE x 4 days. pt denies recent injury/falls. pt stats pain worsens with ambulation. a&ox3, will continue to monitor and reassess

## 2018-08-01 ENCOUNTER — OUTPATIENT (OUTPATIENT)
Dept: OUTPATIENT SERVICES | Facility: HOSPITAL | Age: 53
LOS: 1 days | End: 2018-08-01
Payer: MEDICAID

## 2018-08-01 DIAGNOSIS — Z98.890 OTHER SPECIFIED POSTPROCEDURAL STATES: Chronic | ICD-10-CM

## 2018-08-01 DIAGNOSIS — N39.3 STRESS INCONTINENCE (FEMALE) (MALE): Chronic | ICD-10-CM

## 2018-08-01 PROCEDURE — G9001: CPT

## 2018-08-17 DIAGNOSIS — Z71.89 OTHER SPECIFIED COUNSELING: ICD-10-CM

## 2018-08-18 PROBLEM — K43.2 INCISIONAL HERNIA WITHOUT OBSTRUCTION OR GANGRENE: Chronic | Status: ACTIVE | Noted: 2017-05-12

## 2018-08-18 PROBLEM — K76.89 OTHER SPECIFIED DISEASES OF LIVER: Chronic | Status: ACTIVE | Noted: 2017-05-12

## 2018-09-18 ENCOUNTER — APPOINTMENT (OUTPATIENT)
Dept: SURGICAL ONCOLOGY | Facility: CLINIC | Age: 53
End: 2018-09-18
Payer: MEDICAID

## 2018-09-18 VITALS
HEIGHT: 62 IN | DIASTOLIC BLOOD PRESSURE: 77 MMHG | HEART RATE: 71 BPM | SYSTOLIC BLOOD PRESSURE: 126 MMHG | OXYGEN SATURATION: 99 % | RESPIRATION RATE: 15 BRPM | WEIGHT: 193 LBS | BODY MASS INDEX: 35.51 KG/M2

## 2018-09-18 PROCEDURE — 99204 OFFICE O/P NEW MOD 45 MIN: CPT

## 2018-10-23 ENCOUNTER — APPOINTMENT (OUTPATIENT)
Dept: ULTRASOUND IMAGING | Facility: IMAGING CENTER | Age: 53
End: 2018-10-23
Payer: MEDICAID

## 2018-10-23 ENCOUNTER — OUTPATIENT (OUTPATIENT)
Dept: OUTPATIENT SERVICES | Facility: HOSPITAL | Age: 53
LOS: 1 days | End: 2018-10-23
Payer: SELF-PAY

## 2018-10-23 ENCOUNTER — APPOINTMENT (OUTPATIENT)
Dept: MAMMOGRAPHY | Facility: IMAGING CENTER | Age: 53
End: 2018-10-23
Payer: MEDICAID

## 2018-10-23 DIAGNOSIS — Z98.890 OTHER SPECIFIED POSTPROCEDURAL STATES: Chronic | ICD-10-CM

## 2018-10-23 DIAGNOSIS — R89.7 ABNORMAL HISTOLOGICAL FINDINGS IN SPECIMENS FROM OTHER ORGANS, SYSTEMS AND TISSUES: ICD-10-CM

## 2018-10-23 DIAGNOSIS — N39.3 STRESS INCONTINENCE (FEMALE) (MALE): Chronic | ICD-10-CM

## 2018-10-23 PROCEDURE — G0279: CPT | Mod: 26

## 2018-10-23 PROCEDURE — 77066 DX MAMMO INCL CAD BI: CPT

## 2018-10-23 PROCEDURE — 77062 BREAST TOMOSYNTHESIS BI: CPT | Mod: 26

## 2018-10-23 PROCEDURE — 77066 DX MAMMO INCL CAD BI: CPT | Mod: 26

## 2018-10-23 PROCEDURE — 76641 ULTRASOUND BREAST COMPLETE: CPT

## 2018-10-23 PROCEDURE — 76641 ULTRASOUND BREAST COMPLETE: CPT | Mod: 26,50

## 2018-10-23 PROCEDURE — G0279: CPT

## 2018-10-30 ENCOUNTER — APPOINTMENT (OUTPATIENT)
Dept: SURGICAL ONCOLOGY | Facility: CLINIC | Age: 53
End: 2018-10-30
Payer: MEDICAID

## 2018-10-30 VITALS
WEIGHT: 175 LBS | SYSTOLIC BLOOD PRESSURE: 118 MMHG | BODY MASS INDEX: 32.2 KG/M2 | DIASTOLIC BLOOD PRESSURE: 80 MMHG | OXYGEN SATURATION: 98 % | HEART RATE: 105 BPM | HEIGHT: 62 IN

## 2018-10-30 PROCEDURE — 99214 OFFICE O/P EST MOD 30 MIN: CPT

## 2018-11-12 ENCOUNTER — RESULT REVIEW (OUTPATIENT)
Age: 53
End: 2018-11-12

## 2018-11-12 ENCOUNTER — APPOINTMENT (OUTPATIENT)
Dept: MAMMOGRAPHY | Facility: IMAGING CENTER | Age: 53
End: 2018-11-12
Payer: MEDICAID

## 2018-11-12 ENCOUNTER — OUTPATIENT (OUTPATIENT)
Dept: OUTPATIENT SERVICES | Facility: HOSPITAL | Age: 53
LOS: 1 days | End: 2018-11-12
Payer: MEDICAID

## 2018-11-12 DIAGNOSIS — Z98.890 OTHER SPECIFIED POSTPROCEDURAL STATES: Chronic | ICD-10-CM

## 2018-11-12 DIAGNOSIS — N39.3 STRESS INCONTINENCE (FEMALE) (MALE): Chronic | ICD-10-CM

## 2018-11-12 DIAGNOSIS — R92.8 OTHER ABNORMAL AND INCONCLUSIVE FINDINGS ON DIAGNOSTIC IMAGING OF BREAST: ICD-10-CM

## 2018-11-12 PROCEDURE — A4648: CPT

## 2018-11-12 PROCEDURE — 88305 TISSUE EXAM BY PATHOLOGIST: CPT | Mod: 26

## 2018-11-12 PROCEDURE — 77065 DX MAMMO INCL CAD UNI: CPT

## 2018-11-12 PROCEDURE — 77065 DX MAMMO INCL CAD UNI: CPT | Mod: 26,LT

## 2018-11-12 PROCEDURE — 19081 BX BREAST 1ST LESION STRTCTC: CPT | Mod: LT

## 2018-11-12 PROCEDURE — 19081 BX BREAST 1ST LESION STRTCTC: CPT

## 2018-11-12 PROCEDURE — 88305 TISSUE EXAM BY PATHOLOGIST: CPT

## 2018-12-11 ENCOUNTER — APPOINTMENT (OUTPATIENT)
Dept: SURGICAL ONCOLOGY | Facility: CLINIC | Age: 53
End: 2018-12-11
Payer: MEDICAID

## 2018-12-11 VITALS
SYSTOLIC BLOOD PRESSURE: 129 MMHG | HEIGHT: 62 IN | OXYGEN SATURATION: 98 % | HEART RATE: 107 BPM | BODY MASS INDEX: 32.2 KG/M2 | DIASTOLIC BLOOD PRESSURE: 86 MMHG | WEIGHT: 175 LBS

## 2018-12-11 PROCEDURE — 99214 OFFICE O/P EST MOD 30 MIN: CPT

## 2019-01-17 ENCOUNTER — APPOINTMENT (OUTPATIENT)
Dept: MAMMOGRAPHY | Facility: IMAGING CENTER | Age: 54
End: 2019-01-17

## 2019-04-30 ENCOUNTER — APPOINTMENT (OUTPATIENT)
Dept: PLASTIC SURGERY | Facility: CLINIC | Age: 54
End: 2019-04-30
Payer: MEDICAID

## 2019-04-30 ENCOUNTER — APPOINTMENT (OUTPATIENT)
Dept: SURGICAL ONCOLOGY | Facility: CLINIC | Age: 54
End: 2019-04-30
Payer: MEDICAID

## 2019-04-30 VITALS
OXYGEN SATURATION: 98 % | HEIGHT: 62 IN | HEART RATE: 61 BPM | SYSTOLIC BLOOD PRESSURE: 124 MMHG | DIASTOLIC BLOOD PRESSURE: 79 MMHG | BODY MASS INDEX: 33.13 KG/M2 | WEIGHT: 180 LBS

## 2019-04-30 VITALS
HEART RATE: 66 BPM | WEIGHT: 80 LBS | HEIGHT: 62 IN | DIASTOLIC BLOOD PRESSURE: 72 MMHG | SYSTOLIC BLOOD PRESSURE: 111 MMHG | BODY MASS INDEX: 14.72 KG/M2

## 2019-04-30 PROCEDURE — 99214 OFFICE O/P EST MOD 30 MIN: CPT

## 2019-04-30 PROCEDURE — 99203 OFFICE O/P NEW LOW 30 MIN: CPT

## 2019-04-30 NOTE — PHYSICAL EXAM
[Normal] : supple, no neck mass and thyroid not enlarged [Normal Neck Lymph Nodes] : normal neck lymph nodes  [Normal Supraclavicular Lymph Nodes] : normal supraclavicular lymph nodes [Normal Groin Lymph Nodes] : normal groin lymph nodes [Normal Axillary Lymph Nodes] : normal axillary lymph nodes [Normal] : oriented to person, place and time, with appropriate affect [de-identified] : No palpable mass in either breast, bilateral NAC- insignificant, no palpable lymphadenopathy in bilateral axilla and/or neck. Pt has keloid formation at percutaneous biopsy site.A certified chaperone present during my exam at all times. Bilateral breast implants+

## 2019-04-30 NOTE — ASSESSMENT
[FreeTextEntry1] : 52 year old female diagnosed Nov 2017 with focal atypia in Left breast however did not have any surgical intervention since then.  Recent mammo/sono once again noted with calcifications unchanged from last year with recommendation of surgical excision based on last years biopsy pathology.  Final path benign.\par \par Plan:\par \par Considering her previous biopsy results I am recommending Left breast lumpectomy and pt wishes to have the concerned area of calcifications excised.\par I have discussed the diagnosis, therapeutic plan and options with the patient at length. Patient expressed verbal understanding to proceed with proposed plan. All questions answered.\par I have discussed the risks, benefits, alternatives, complications including but not limited bleeding, infection, damage to adjacent structures, recurrence to the patient in detail. Patient expressed verbal understanding. Written informed consent to be obtained in the preoperative period.\par \par \par

## 2019-04-30 NOTE — HISTORY OF PRESENT ILLNESS
[de-identified] : Ms. Tony is a 53 year old female who presents today for a follow up exam.  \par \par Last year Samantha went for her annual breast imaging including mammo/sono in 2017 which revealed calcifications in the upper outer Left breast.  She underwent a stereotactic biopsy which revealed Focal Atypia.  Samantha states that her PCP attempted to refer her to a surgeon however could not find one who accepts her insurance.  She presents today for follow up of her pathology results.  She is s/p bilateral breast augmentation with saline implants in  (Dr. Patel). \par \par She went for a repeat mammo/sono on 10/26/18 which once again revealed Left breast calcifications unchanged from prior imaging.  Since prior biopsy found focal atypia, surgical excision is advised (BIRADS 4).  Final pathology showed fibrocystic changes, usual ductal hyperplasia and microcalcifications.  During her last visit in 2018 I recommended a Left breast lumpectomy.\par \par No significant PMH.\par Denies family history of breast or ovarian cancer.\par \par Menarche: 13 y.o.\par  (Age at first pregnancy: 23)\par \par Internist: Brandon Echevarria [phone]

## 2019-05-17 ENCOUNTER — OUTPATIENT (OUTPATIENT)
Dept: OUTPATIENT SERVICES | Facility: HOSPITAL | Age: 54
LOS: 1 days | End: 2019-05-17
Payer: MEDICAID

## 2019-05-17 VITALS
SYSTOLIC BLOOD PRESSURE: 122 MMHG | HEIGHT: 62 IN | RESPIRATION RATE: 16 BRPM | TEMPERATURE: 99 F | DIASTOLIC BLOOD PRESSURE: 70 MMHG | WEIGHT: 195.99 LBS | HEART RATE: 90 BPM

## 2019-05-17 DIAGNOSIS — N39.3 STRESS INCONTINENCE (FEMALE) (MALE): Chronic | ICD-10-CM

## 2019-05-17 DIAGNOSIS — Z98.890 OTHER SPECIFIED POSTPROCEDURAL STATES: Chronic | ICD-10-CM

## 2019-05-17 DIAGNOSIS — R89.7 ABNORMAL HISTOLOGICAL FINDINGS IN SPECIMENS FROM OTHER ORGANS, SYSTEMS AND TISSUES: ICD-10-CM

## 2019-05-17 LAB
ALBUMIN SERPL ELPH-MCNC: 4 G/DL — SIGNIFICANT CHANGE UP (ref 3.3–5)
ALP SERPL-CCNC: 71 U/L — SIGNIFICANT CHANGE UP (ref 40–120)
ALT FLD-CCNC: 10 U/L — SIGNIFICANT CHANGE UP (ref 4–33)
ANION GAP SERPL CALC-SCNC: 10 MMO/L — SIGNIFICANT CHANGE UP (ref 7–14)
AST SERPL-CCNC: 15 U/L — SIGNIFICANT CHANGE UP (ref 4–32)
BILIRUB SERPL-MCNC: < 0.2 MG/DL — LOW (ref 0.2–1.2)
BUN SERPL-MCNC: 12 MG/DL — SIGNIFICANT CHANGE UP (ref 7–23)
CALCIUM SERPL-MCNC: 9.3 MG/DL — SIGNIFICANT CHANGE UP (ref 8.4–10.5)
CHLORIDE SERPL-SCNC: 105 MMOL/L — SIGNIFICANT CHANGE UP (ref 98–107)
CO2 SERPL-SCNC: 27 MMOL/L — SIGNIFICANT CHANGE UP (ref 22–31)
CREAT SERPL-MCNC: 0.83 MG/DL — SIGNIFICANT CHANGE UP (ref 0.5–1.3)
GLUCOSE SERPL-MCNC: 98 MG/DL — SIGNIFICANT CHANGE UP (ref 70–99)
HBA1C BLD-MCNC: 7.5 % — HIGH (ref 4–5.6)
HCT VFR BLD CALC: 40 % — SIGNIFICANT CHANGE UP (ref 34.5–45)
HGB BLD-MCNC: 12.2 G/DL — SIGNIFICANT CHANGE UP (ref 11.5–15.5)
MCHC RBC-ENTMCNC: 27.3 PG — SIGNIFICANT CHANGE UP (ref 27–34)
MCHC RBC-ENTMCNC: 30.5 % — LOW (ref 32–36)
MCV RBC AUTO: 89.5 FL — SIGNIFICANT CHANGE UP (ref 80–100)
NRBC # FLD: 0 K/UL — SIGNIFICANT CHANGE UP (ref 0–0)
PLATELET # BLD AUTO: 211 K/UL — SIGNIFICANT CHANGE UP (ref 150–400)
PMV BLD: 10.9 FL — SIGNIFICANT CHANGE UP (ref 7–13)
POTASSIUM SERPL-MCNC: 3.8 MMOL/L — SIGNIFICANT CHANGE UP (ref 3.5–5.3)
POTASSIUM SERPL-SCNC: 3.8 MMOL/L — SIGNIFICANT CHANGE UP (ref 3.5–5.3)
PROT SERPL-MCNC: 7.2 G/DL — SIGNIFICANT CHANGE UP (ref 6–8.3)
RBC # BLD: 4.47 M/UL — SIGNIFICANT CHANGE UP (ref 3.8–5.2)
RBC # FLD: 14 % — SIGNIFICANT CHANGE UP (ref 10.3–14.5)
SODIUM SERPL-SCNC: 142 MMOL/L — SIGNIFICANT CHANGE UP (ref 135–145)
WBC # BLD: 5.31 K/UL — SIGNIFICANT CHANGE UP (ref 3.8–10.5)
WBC # FLD AUTO: 5.31 K/UL — SIGNIFICANT CHANGE UP (ref 3.8–10.5)

## 2019-05-17 PROCEDURE — 93010 ELECTROCARDIOGRAM REPORT: CPT

## 2019-05-17 NOTE — H&P PST ADULT - ASSESSMENT
This is a  51y /o Fijian Female who speaks fluent English, who presents to [facility] prior to proposed procedure with Dr. Hwang for laparoscopic possible open incisional hernia repair with mesh, possible component separation .    1. Labs: per Dr. Hwang.  2. EKG on chart from Dr. Echevarria office.  3. Clearance completed per patient with Dr. Larios, 5/11/2017.  4. EZ sponges, Mupirocin ointment holistic sheet, pamphlet and day of procedure instructions provided and reviewed with patient. This is a  51y /o Qat[place] Female who speaks fluent English, who presents to PST prior to proposed procedure with Dr. Hwang for laparoscopic possible open incisional hernia repair with mesh, possible component separation .    1. Labs: per Dr. Hwang.  2. EKG on c preop dx abnormal histological findings in specimens from other organ, system & tissues

## 2019-05-17 NOTE — H&P PST ADULT - NSICDXPASTSURGICALHX_GEN_ALL_CORE_FT
PAST SURGICAL HISTORY:  Female stress incontinence Interstem insertion and removed 1/2017    H/O abdominoplasty 9/2016 - [facility]    H/O microdiscectomy 1/2019    H/O: hysterectomy 2007    Right knee arthroscopy 1998, left knee arthroscopy 2005 1998    S/P Ankle Ligament Repair Left-8/20/10    S/P MARIYA-BSO 2007    Tubal Ligation (ICD9 V26.51) 1997 1997    Urinary incontinence vaginal sling in 2011 and removed 1/2017    Uterine artery embolization, Vaginal sling 01/2007 PAST SURGICAL HISTORY:  Female stress incontinence Interstem insertion and removed 1/2017    H/O abdominoplasty 9/2016 - [facility]    H/O microdiscectomy 1/2019    H/O: hysterectomy 2007    History of augmentation of both breasts     Right knee arthroscopy 1998, left knee arthroscopy 2005 1998    S/P Ankle Ligament Repair Left-8/20/10    S/P MARIYA-BSO 2007    Tubal Ligation (ICD9 V26.51) 1997 1997    Urinary incontinence vaginal sling in 2011 and removed 1/2017    Uterine artery embolization, Vaginal sling 01/2007

## 2019-05-17 NOTE — H&P PST ADULT - VISION (WITH CORRECTIVE LENSES IF THE PATIENT USUALLY WEARS THEM):
Partially impaired: cannot see medication labels or newsprint, but can see obstacles in path, and the surrounding layout; can count fingers at arm's length/Progressive lens'

## 2019-05-17 NOTE — H&P PST ADULT - NSICDXPROBLEM_GEN_ALL_CORE_FT
preop dx abnormal histological findings in specimens from other organ, system & tissues   Scheduled for left breast lumpectomy with Lucrecia  localization on 5/23/19  preop instructions, gi prophylaxis & surgical soap given  pt verbalized understanding

## 2019-05-17 NOTE — H&P PST ADULT - NSICDXPASTMEDICALHX_GEN_ALL_CORE_FT
PAST MEDICAL HISTORY:  Anxiety     Depression (ICD9 311)     Diabetes Mellitus Type II (ICD9 250.00) No meds at present    GERD (Gastroesophageal Reflux Disease) (ICD9 530.81)     Incisional hernia     Liver cyst 2017    Obesity (ICD9 278.00)     Right Knee arthroscopy 1998, left knee arthroscopy 2005     Umbilical Hernia repair 1997     Uterine leiomyoma

## 2019-05-20 ENCOUNTER — FORM ENCOUNTER (OUTPATIENT)
Age: 54
End: 2019-05-20

## 2019-05-21 ENCOUNTER — APPOINTMENT (OUTPATIENT)
Dept: MAMMOGRAPHY | Facility: IMAGING CENTER | Age: 54
End: 2019-05-21
Payer: MEDICAID

## 2019-05-21 ENCOUNTER — OUTPATIENT (OUTPATIENT)
Dept: OUTPATIENT SERVICES | Facility: HOSPITAL | Age: 54
LOS: 1 days | End: 2019-05-21
Payer: MEDICAID

## 2019-05-21 DIAGNOSIS — R92.8 OTHER ABNORMAL AND INCONCLUSIVE FINDINGS ON DIAGNOSTIC IMAGING OF BREAST: ICD-10-CM

## 2019-05-21 DIAGNOSIS — Z98.890 OTHER SPECIFIED POSTPROCEDURAL STATES: Chronic | ICD-10-CM

## 2019-05-21 DIAGNOSIS — N39.3 STRESS INCONTINENCE (FEMALE) (MALE): Chronic | ICD-10-CM

## 2019-05-21 PROCEDURE — C1739: CPT

## 2019-05-21 PROCEDURE — 19281 PERQ DEVICE BREAST 1ST IMAG: CPT | Mod: LT

## 2019-05-21 PROCEDURE — 19281 PERQ DEVICE BREAST 1ST IMAG: CPT

## 2019-05-22 ENCOUNTER — FORM ENCOUNTER (OUTPATIENT)
Age: 54
End: 2019-05-22

## 2019-05-23 ENCOUNTER — RESULT REVIEW (OUTPATIENT)
Age: 54
End: 2019-05-23

## 2019-05-23 ENCOUNTER — APPOINTMENT (OUTPATIENT)
Dept: SURGICAL ONCOLOGY | Facility: AMBULATORY SURGERY CENTER | Age: 54
End: 2019-05-23

## 2019-05-23 ENCOUNTER — OUTPATIENT (OUTPATIENT)
Dept: OUTPATIENT SERVICES | Facility: HOSPITAL | Age: 54
LOS: 1 days | Discharge: HOME | End: 2019-05-23
Payer: MEDICAID

## 2019-05-23 ENCOUNTER — APPOINTMENT (OUTPATIENT)
Dept: MAMMOGRAPHY | Facility: IMAGING CENTER | Age: 54
End: 2019-05-23
Payer: MEDICAID

## 2019-05-23 ENCOUNTER — OUTPATIENT (OUTPATIENT)
Dept: OUTPATIENT SERVICES | Facility: HOSPITAL | Age: 54
LOS: 1 days | End: 2019-05-23
Payer: MEDICAID

## 2019-05-23 VITALS
OXYGEN SATURATION: 99 % | RESPIRATION RATE: 16 BRPM | SYSTOLIC BLOOD PRESSURE: 137 MMHG | HEART RATE: 79 BPM | WEIGHT: 195.99 LBS | DIASTOLIC BLOOD PRESSURE: 78 MMHG | TEMPERATURE: 98 F | HEIGHT: 62 IN

## 2019-05-23 VITALS
DIASTOLIC BLOOD PRESSURE: 68 MMHG | SYSTOLIC BLOOD PRESSURE: 122 MMHG | OXYGEN SATURATION: 99 % | RESPIRATION RATE: 16 BRPM

## 2019-05-23 DIAGNOSIS — N39.3 STRESS INCONTINENCE (FEMALE) (MALE): Chronic | ICD-10-CM

## 2019-05-23 DIAGNOSIS — Z98.890 OTHER SPECIFIED POSTPROCEDURAL STATES: Chronic | ICD-10-CM

## 2019-05-23 DIAGNOSIS — R92.8 OTHER ABNORMAL AND INCONCLUSIVE FINDINGS ON DIAGNOSTIC IMAGING OF BREAST: ICD-10-CM

## 2019-05-23 DIAGNOSIS — R89.7 ABNORMAL HISTOLOGICAL FINDINGS IN SPECIMENS FROM OTHER ORGANS, SYSTEMS AND TISSUES: ICD-10-CM

## 2019-05-23 LAB — GLUCOSE BLDC GLUCOMTR-MCNC: 121 MG/DL — HIGH (ref 70–99)

## 2019-05-23 PROCEDURE — 88305 TISSUE EXAM BY PATHOLOGIST: CPT | Mod: 26

## 2019-05-23 PROCEDURE — 88341 IMHCHEM/IMCYTCHM EA ADD ANTB: CPT | Mod: 26,59

## 2019-05-23 PROCEDURE — 88360 TUMOR IMMUNOHISTOCHEM/MANUAL: CPT | Mod: 26

## 2019-05-23 PROCEDURE — 76098 X-RAY EXAM SURGICAL SPECIMEN: CPT | Mod: 26

## 2019-05-23 PROCEDURE — 19301 PARTIAL MASTECTOMY: CPT

## 2019-05-23 PROCEDURE — 76098 X-RAY EXAM SURGICAL SPECIMEN: CPT

## 2019-05-23 PROCEDURE — 88342 IMHCHEM/IMCYTCHM 1ST ANTB: CPT | Mod: 26,59

## 2019-05-23 RX ORDER — SODIUM CHLORIDE 9 MG/ML
1000 INJECTION, SOLUTION INTRAVENOUS
Refills: 0 | Status: DISCONTINUED | OUTPATIENT
Start: 2019-05-23 | End: 2019-06-07

## 2019-05-23 RX ORDER — ACETAMINOPHEN 500 MG
2 TABLET ORAL
Qty: 0 | Refills: 0 | DISCHARGE

## 2019-05-23 NOTE — ASU DISCHARGE PLAN (ADULT/PEDIATRIC) - FOLLOW UP APPOINTMENTS
/[facility] ([facility]):

## 2019-05-23 NOTE — ASU DISCHARGE PLAN (ADULT/PEDIATRIC) - ASU DC SPECIAL INSTRUCTIONSFT
Please follow up with Dr. Bailey next Friday after discharge from the hospital. You may call [phone] to schedule an appointment.     Please follow up with Dr. Crowe in two weeks to discuss pathology.

## 2019-05-23 NOTE — BRIEF OPERATIVE NOTE - NSICDXBRIEFPROCEDURE_GEN_ALL_CORE_FT
PROCEDURES:  Closure, surgical wound or dehiscence, extensive or complex, secondary 23-May-2019 10:09:31  Anju Lawson  Lumpectomy, breast, unilateral 23-May-2019 10:09:12  Anju Lawson

## 2019-05-23 NOTE — ASU DISCHARGE PLAN (ADULT/PEDIATRIC) - CALL YOUR DOCTOR IF YOU HAVE ANY OF THE FOLLOWING:
Wound/Surgical Site with redness, or foul smelling discharge or pus/Bleeding that does not stop Wound/Surgical Site with redness, or foul smelling discharge or pus/Numbness, tingling, color or temperature change to extremity/Bleeding that does not stop/Fever greater than (need to indicate Fahrenheit or Celsius)/Swelling that gets worse/Unable to urinate/Inability to tolerate liquids or foods/Pain not relieved by Medications/Nausea and vomiting that does not stop

## 2019-05-23 NOTE — ASU DISCHARGE PLAN (ADULT/PEDIATRIC) - PAIN MANAGEMENT
At [facility] vivo/Prescriptions electronically submitted to pharmacy from [facility] Prescriptions electronically submitted to pharmacy from [facility]/Last given tylenol at 09:30am, may take percocet 3:30pm with food

## 2019-05-23 NOTE — ASU DISCHARGE PLAN (ADULT/PEDIATRIC) - CARE PROVIDER_API CALL
Hoang Bailey)  Surgery  [facility]  [address], [address]  [place] [address]  Phone: [phone]  Fax: [phone]  Follow Up Time:     Mateo Luis)  Surgery  [address] [place] [address], [place] [address]  Phone: [phone]  Fax: [phone]  Follow Up Time:

## 2019-05-27 NOTE — REASON FOR VISIT
[Consultation] : a consultation visit [FreeTextEntry1] : Pt presents here at the request of Dr. Crowe. Pt states she is scheduled for a left breast lumpectomy. Pt states Dr. Crowe prefers plastics to assist in surgery b/c of implants. Pt states implants were placed 3 yrs ago.

## 2019-05-27 NOTE — PHYSICAL EXAM
[NI] : Normal [de-identified] : No palpable mass in either breast, bilateral NAC- insignificant, no palpable lymphadenopathy in bilateral axilla and/or neck. Pt has keloid formation at percutaneous biopsy site.A certified chaperone present during my exam at all times. Bilateral breast implants+

## 2019-05-27 NOTE — HISTORY OF PRESENT ILLNESS
[FreeTextEntry1] :  53 year old female referred by Dr. Crowe with diagnosis of left breast mass. Patient is scheduled for excision. Patient presents for follow up visit.\par \par Last year Samantha went for her annual breast imaging including mammo/sono in  which revealed calcifications in the upper outer Left breast. She underwent a stereotactic biopsy which revealed Focal Atypia. Samantha states that her PCP attempted to refer her to a surgeon however could not find one who accepts her insurance. She presents today for follow up of her pathology results. She is s/p bilateral breast augmentation with saline implants in  (Dr. Patel). \par \par She went for a repeat mammo/sono on 10/26/18 which once again revealed Left breast calcifications unchanged from prior imaging. Since prior biopsy found focal atypia, surgical excision is advised (BIRADS 4). Final pathology showed fibrocystic changes, usual ductal hyperplasia and microcalcifications. During her last visit in 2018 I recommended a Left breast lumpectomy.\par \par No significant PMH.\par Denies family history of breast or ovarian cancer.\par \par Menarche: 13 y.o.\par  (Age at first pregnancy: 23)\par \par Internist: Brandon Echevarria [phone].

## 2019-07-09 ENCOUNTER — APPOINTMENT (OUTPATIENT)
Dept: SURGICAL ONCOLOGY | Facility: CLINIC | Age: 54
End: 2019-07-09
Payer: MEDICAID

## 2019-07-09 VITALS
SYSTOLIC BLOOD PRESSURE: 116 MMHG | DIASTOLIC BLOOD PRESSURE: 79 MMHG | HEART RATE: 69 BPM | WEIGHT: 180 LBS | BODY MASS INDEX: 33.13 KG/M2 | HEIGHT: 62 IN | RESPIRATION RATE: 16 BRPM | TEMPERATURE: 97.9 F

## 2019-07-09 PROCEDURE — 99024 POSTOP FOLLOW-UP VISIT: CPT

## 2019-07-10 NOTE — PHYSICAL EXAM
[Normal] : well developed, well nourished, in no acute distress [de-identified] : Left breast incision well healed.  Prior biopsy sites with small keloids. [FreeTextEntry1] : RC present for exam.

## 2019-07-10 NOTE — ASSESSMENT
[FreeTextEntry1] : 53 year old female diagnosed Nov 2017 with focal atypia in Left breast however did not have any surgical intervention since then.  Recent mammo/sono once again noted with calcifications unchanged from last year with recommendation of surgical excision based on last years biopsy pathology.  Final path benign.  Considering her previous biopsy results I recommended excisional biopsy.  She is  s/p left breast lumpectomy on 5/23/19 with a final path of focal DCIS intermediate grade (Er/Pr+).\par \par Plan:\par RTO in 6 months\par Pathology discussed with pt\par Referral to med/onc to discuss role of chemoprevention\par Referral to rad/onc\par Referral to / Richard for screening  colonoscopy\par I have discussed the diagnosis, therapeutic plan and options with the patient at length. Patient expressed verbal understanding to proceed with proposed plan. All questions answered.

## 2019-07-10 NOTE — CONSULT LETTER
[Dear  ___] : Dear  [unfilled], [Referral Letter:] : I am referring [unfilled] to you for further evaluation.  My most recent evaluation follows. [Please see my note below.] : Please see my note below. [( Thank you for referring [unfilled] for consultation for _____ )] : Thank you for referring [unfilled] for consultation for [unfilled] [Sincerely,] : Sincerely, [Consult Closing:] : Thank you very much for allowing me to participate in the care of this patient.  If you have any questions, please do not hesitate to contact me. [FreeTextEntry3] : Mateo Crowe MD, FICS, FACS\par , Surgical Oncology\par [facility]\par [address]\[place]- [address]\par \par [address]\[place]- [address]\par \par (mob) [phone]\par (o) [phone]\par (f) [phone]\par

## 2019-07-10 NOTE — HISTORY OF PRESENT ILLNESS
[de-identified] : Ms. Tony is a 53 year old female who presents today for her initial post-op evaluation.\par \par Samantha went for her annual breast imaging including mammo/sono in 2017 which revealed calcifications in the upper outer Left breast. She underwent a stereotactic biopsy which revealed Focal Atypia. Samantha states that her PCP attempted to refer her to a surgeon however could not find one who accepts her insurance. She presents today for follow up of her pathology results. She is s/p bilateral breast augmentation with saline implants in  (Dr. Patel). \par \par She went for a repeat mammo/sono on 10/26/18 which once again revealed Left breast calcifications unchanged from prior imaging. Since prior biopsy found focal atypia, surgical excision is advised (BIRADS 4). Final pathology showed fibrocystic changes, usual ductal hyperplasia and microcalcifications. During her last visit in 2018 I recommended a Left breast lumpectomy.\par \par She is now s/p left breast lumpectomy on 19 with a final path of focal DCIS intermediate grade (Er/Pr+).  Today she is feeling generally well without complaint.  Denies fevers in the post op period.  Endorses one episode of BRBPR last week however has yet to follow up for a colonoscopy as previously recommended.  Patient has never had a baseline colonoscopy.\par \par No significant PMH.\par Denies family history of breast or ovarian cancer.\par \par Menarche: 13 y.o.\par  (Age at first pregnancy: 23)\par \par Internist: Brandon Echevarria [phone]

## 2019-07-16 ENCOUNTER — APPOINTMENT (OUTPATIENT)
Dept: PLASTIC SURGERY | Facility: CLINIC | Age: 54
End: 2019-07-16
Payer: MEDICAID

## 2019-07-16 PROCEDURE — 99213 OFFICE O/P EST LOW 20 MIN: CPT

## 2019-07-16 NOTE — PHYSICAL EXAM
[NI] : Normal [de-identified] : incision c/d/i healing well no erythema no sign of infection small depression near the scar

## 2019-07-16 NOTE — HISTORY OF PRESENT ILLNESS
[FreeTextEntry1] : \par Ms. Tony is a 53 year old female who presents today for her initial post-op evaluation.\par \par Samantha went for her annual breast imaging including mammo/sono in  which revealed calcifications in the upper outer Left breast. She underwent a stereotactic biopsy which revealed Focal Atypia. Samantha states that her PCP attempted to refer her to a surgeon however could not find one who accepts her insurance. She presents today for follow up of her pathology results. She is s/p bilateral breast augmentation with saline implants in  (Dr. Patel). \par \par She went for a repeat mammo/sono on 10/26/18 which once again revealed Left breast calcifications unchanged from prior imaging. Since prior biopsy found focal atypia, surgical excision is advised (BIRADS 4). Final pathology showed fibrocystic changes, usual ductal hyperplasia and microcalcifications. During her last visit in 2018 I recommended a Left breast lumpectomy.\par \par She is now s/p left breast lumpectomy on 19 with a final path of focal DCIS intermediate grade (Er/Pr+). Today she is feeling generally well without complaint. Denies fevers in the post op period. Endorses one episode of BRBPR last week however has yet to follow up for a colonoscopy as previously recommended. Patient has never had a baseline colonoscopy.\par \par No significant PMH.\par Denies family history of breast or ovarian cancer.\par \par Menarche: 13 y.o.\par  (Age at first pregnancy: 23)\par \par Internist: Brandon Echevarria [phone]. \par

## 2019-07-26 ENCOUNTER — APPOINTMENT (OUTPATIENT)
Age: 54
End: 2019-07-26
Payer: MEDICAID

## 2019-07-26 ENCOUNTER — LABORATORY RESULT (OUTPATIENT)
Age: 54
End: 2019-07-26

## 2019-07-26 VITALS
SYSTOLIC BLOOD PRESSURE: 116 MMHG | HEART RATE: 65 BPM | WEIGHT: 190 LBS | DIASTOLIC BLOOD PRESSURE: 75 MMHG | TEMPERATURE: 98.3 F | BODY MASS INDEX: 34.96 KG/M2 | HEIGHT: 62 IN

## 2019-07-26 LAB
HCT VFR BLD CALC: 41.6 %
HGB BLD-MCNC: 13.1 G/DL
MCHC RBC-ENTMCNC: 27.8 PG
MCHC RBC-ENTMCNC: 31.6 GM/DL
MCV RBC AUTO: 87.9 FL
PLATELET # BLD AUTO: 197 K/UL
RBC # BLD: 4.73 M/UL
RBC # FLD: 12.6 %
WBC # FLD AUTO: 5.7 K/UL

## 2019-07-26 PROCEDURE — 36415 COLL VENOUS BLD VENIPUNCTURE: CPT

## 2019-07-26 PROCEDURE — 99205 OFFICE O/P NEW HI 60 MIN: CPT | Mod: 25

## 2019-07-26 PROCEDURE — 85025 COMPLETE CBC W/AUTO DIFF WBC: CPT

## 2019-07-27 LAB
25(OH)D3 SERPL-MCNC: 13.3 NG/ML
ALBUMIN SERPL ELPH-MCNC: 4.3 G/DL
ALP BLD-CCNC: 59 U/L
ALT SERPL-CCNC: 15 U/L
ANION GAP SERPL CALC-SCNC: 12 MMOL/L
AST SERPL-CCNC: 15 U/L
BILIRUB SERPL-MCNC: 0.2 MG/DL
BUN SERPL-MCNC: 16 MG/DL
CALCIUM SERPL-MCNC: 10 MG/DL
CHLORIDE SERPL-SCNC: 102 MMOL/L
CO2 SERPL-SCNC: 26 MMOL/L
CREAT SERPL-MCNC: 0.87 MG/DL
GLUCOSE SERPL-MCNC: 141 MG/DL
POTASSIUM SERPL-SCNC: 4.3 MMOL/L
PROT SERPL-MCNC: 6.9 G/DL
SODIUM SERPL-SCNC: 140 MMOL/L

## 2019-07-27 NOTE — RESULTS/DATA
[FreeTextEntry1] : I reviewed recent pathology/ imaging + today's blood work results with patient.\par

## 2019-07-27 NOTE — ASSESSMENT
[FreeTextEntry1] : Ms. BILL 's questions were answered to her satisfaction. She expressed her understanding and willingness to comply with the above recommendations, and  will return to the office in 3 months.\par

## 2019-07-27 NOTE — PHYSICAL EXAM
[Fully active, able to carry on all pre-disease performance without restriction] : Status 0 - Fully active, able to carry on all pre-disease performance without restriction [Normal] : affect appropriate [de-identified] : bilateral breast augmentation; left breast scar, s/p excisional biopsy- well healed.

## 2019-07-27 NOTE — HISTORY OF PRESENT ILLNESS
[Disease: _____________________] : Disease: [unfilled] [AJCC Stage: ____] : AJCC Stage: [unfilled] [de-identified] : 53  female ( born in [place]), with history of supracervical hysterectomy, diagnosed with intermediate grade DCIS left breast in May 2019.\par \par CASE SYNOPSIS:\par 2016:  bilateral breast augmentation with saline implants (Dr. Patel)\par 11/2/17: Mammogram/sonogram revealed dense breasts indeterminate cluster of calcification in the outer upper left breast for which stereotactic biopsy recommended.\par 11/24/17- Stereotactic biopsy revealed fibrocystic changes with proliferative features including microcyst formation, adenosis, usual ductal hyperplasia, columnar cell change, associated microcalcification and apocrine metaplasia associated with focal atypia.\par 10/26/18: Mammogram- left breast calcification unchanged from prior imaging; surgical excision advised. Pathology showed fibrocystic changes, ductal hyperplasia and microcalcification.\par 5/23/19- left breast lumpectomy; final pathology consistent with focal DCIS, intermediate grade, ER positive DE positive, cribriform with necrosis. Margins negative.\par 7/23/19- evaluated by RT oncology ( Dr. Houser) -DCISionRT score 0.8 ( low)- no need for XRT. [de-identified] : DCIS [de-identified] : ER positive OK positive [FreeTextEntry1] : plan adjuvant hormonal therapy.

## 2019-07-27 NOTE — CONSULT LETTER
[Dear  ___] : Dear  [unfilled], [Consult Letter:] : I had the pleasure of evaluating your patient, [unfilled]. [Please see my note below.] : Please see my note below. [Consult Closing:] : Thank you very much for allowing me to participate in the care of this patient.  If you have any questions, please do not hesitate to contact me. [Sincerely,] : Sincerely, [DrDanna  ___] : Dr. SANTILLAN [FreeTextEntry2] : Mateo Covington M.D [FreeTextEntry3] : JAYME JAMES M.D.\par Medical Oncology\par [facility] [place]\par [facility]\par [address] [place] [address], [facility]\par [place] [address]\par Telephone: [phone]\par FAX: [phone]\par \par  [___] : [unfilled]

## 2019-07-29 LAB — CANCER AG27-29 SERPL-ACNC: 9.8 U/ML

## 2019-08-01 ENCOUNTER — APPOINTMENT (OUTPATIENT)
Dept: OBGYN | Facility: HOSPITAL | Age: 54
End: 2019-08-01
Payer: MEDICAID

## 2019-08-01 ENCOUNTER — OUTPATIENT (OUTPATIENT)
Dept: OUTPATIENT SERVICES | Facility: HOSPITAL | Age: 54
LOS: 1 days | End: 2019-08-01

## 2019-08-01 ENCOUNTER — LABORATORY RESULT (OUTPATIENT)
Age: 54
End: 2019-08-01

## 2019-08-01 ENCOUNTER — RESULT REVIEW (OUTPATIENT)
Age: 54
End: 2019-08-01

## 2019-08-01 VITALS
HEIGHT: 62 IN | WEIGHT: 192.9 LBS | SYSTOLIC BLOOD PRESSURE: 121 MMHG | BODY MASS INDEX: 35.5 KG/M2 | HEART RATE: 59 BPM | DIASTOLIC BLOOD PRESSURE: 71 MMHG

## 2019-08-01 DIAGNOSIS — Z01.419 ENCOUNTER FOR GYNECOLOGICAL EXAMINATION (GENERAL) (ROUTINE) W/OUT ABNORMAL FINDINGS: ICD-10-CM

## 2019-08-01 DIAGNOSIS — Z98.890 OTHER SPECIFIED POSTPROCEDURAL STATES: Chronic | ICD-10-CM

## 2019-08-01 DIAGNOSIS — N39.3 STRESS INCONTINENCE (FEMALE) (MALE): Chronic | ICD-10-CM

## 2019-08-01 LAB
HIV COMBO RESULT: SIGNIFICANT CHANGE UP
HIV1+2 AB SPEC QL: SIGNIFICANT CHANGE UP

## 2019-08-01 PROCEDURE — XXXXX: CPT

## 2019-08-02 DIAGNOSIS — Z00.00 ENCOUNTER FOR GENERAL ADULT MEDICAL EXAMINATION WITHOUT ABNORMAL FINDINGS: ICD-10-CM

## 2019-08-02 DIAGNOSIS — Z01.419 ENCOUNTER FOR GYNECOLOGICAL EXAMINATION (GENERAL) (ROUTINE) WITHOUT ABNORMAL FINDINGS: ICD-10-CM

## 2019-08-02 LAB
C TRACH RRNA SPEC QL NAA+PROBE: SIGNIFICANT CHANGE UP
HPV HIGH+LOW RISK DNA PNL CVX: DETECTED
N GONORRHOEA RRNA SPEC QL NAA+PROBE: SIGNIFICANT CHANGE UP
SPECIMEN SOURCE: SIGNIFICANT CHANGE UP
T PALLIDUM AB TITR SER: NEGATIVE — SIGNIFICANT CHANGE UP

## 2019-08-07 LAB — CYTOLOGY SPEC DOC CYTO: SIGNIFICANT CHANGE UP

## 2019-08-25 ENCOUNTER — EMERGENCY (EMERGENCY)
Facility: HOSPITAL | Age: 54
LOS: 0 days | Discharge: HOME | End: 2019-08-26
Attending: EMERGENCY MEDICINE
Payer: COMMERCIAL

## 2019-08-25 VITALS — HEIGHT: 62 IN | WEIGHT: 175.05 LBS

## 2019-08-25 DIAGNOSIS — Z90.710 ACQUIRED ABSENCE OF BOTH CERVIX AND UTERUS: ICD-10-CM

## 2019-08-25 DIAGNOSIS — S80.01XA CONTUSION OF RIGHT KNEE, INITIAL ENCOUNTER: ICD-10-CM

## 2019-08-25 DIAGNOSIS — Z98.890 OTHER SPECIFIED POSTPROCEDURAL STATES: Chronic | ICD-10-CM

## 2019-08-25 DIAGNOSIS — E11.9 TYPE 2 DIABETES MELLITUS WITHOUT COMPLICATIONS: ICD-10-CM
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
## 2019-08-25 DIAGNOSIS — S09.90XA UNSPECIFIED INJURY OF HEAD, INITIAL ENCOUNTER: ICD-10-CM

## 2019-08-25 DIAGNOSIS — S80.02XA CONTUSION OF LEFT KNEE, INITIAL ENCOUNTER: ICD-10-CM

## 2019-08-25 DIAGNOSIS — M50.320 OTHER CERVICAL DISC DEGENERATION, MID-CERVICAL REGION, UNSPECIFIED LEVEL: ICD-10-CM
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
## 2019-08-25 DIAGNOSIS — N39.3 STRESS INCONTINENCE (FEMALE) (MALE): Chronic | ICD-10-CM

## 2019-08-25 PROCEDURE — 99284 EMERGENCY DEPT VISIT MOD MDM: CPT

## 2019-08-25 PROCEDURE — 70450 CT HEAD/BRAIN W/O DYE: CPT

## 2019-08-25 PROCEDURE — 99053 MED SERV 10PM-8AM 24 HR FAC: CPT

## 2019-08-25 PROCEDURE — 72125 CT NECK SPINE W/O DYE: CPT | Mod: 26

## 2019-08-25 PROCEDURE — 73564 X-RAY EXAM KNEE 4 OR MORE: CPT | Mod: 50

## 2019-08-25 PROCEDURE — 73564 X-RAY EXAM KNEE 4 OR MORE: CPT | Mod: 26,50

## 2019-08-25 PROCEDURE — 72125 CT NECK SPINE W/O DYE: CPT

## 2019-08-25 PROCEDURE — 70450 CT HEAD/BRAIN W/O DYE: CPT | Mod: 26

## 2019-08-25 PROCEDURE — 99284 EMERGENCY DEPT VISIT MOD MDM: CPT | Mod: 25

## 2019-08-25 RX ORDER — ACETAMINOPHEN 500 MG
650 TABLET ORAL ONCE
Refills: 0 | Status: COMPLETED | OUTPATIENT
Start: 2019-08-25 | End: 2019-08-25

## 2019-08-25 RX ORDER — CYCLOBENZAPRINE HYDROCHLORIDE 10 MG/1
10 TABLET, FILM COATED ORAL ONCE
Refills: 0 | Status: COMPLETED | OUTPATIENT
Start: 2019-08-25 | End: 2019-08-25

## 2019-08-25 RX ADMIN — Medication 650 MILLIGRAM(S): at 23:41

## 2019-08-25 RX ADMIN — Medication 650 MILLIGRAM(S): at 22:41

## 2019-08-25 RX ADMIN — CYCLOBENZAPRINE HYDROCHLORIDE 10 MILLIGRAM(S): 10 TABLET, FILM COATED ORAL at 22:41
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
## 2019-08-25 NOTE — ED ADULT NURSE NOTE - NSIMPLEMENTINTERV_GEN_ALL_ED
Implemented All Universal Safety Interventions:  Bunch to call system. Call bell, personal items and telephone within reach. Instruct patient to call for assistance. Room bathroom lighting operational. Non-slip footwear when patient is off stretcher. Physically safe environment: no spills, clutter or unnecessary equipment. Stretcher in lowest position, wheels locked, appropriate side rails in place.
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
## 2019-08-25 NOTE — ED ADULT NURSE NOTE - CHPI ED NUR SYMPTOMS NEG
no fussiness/no sleeping issues/no crying/no decreased eating/drinking/no difficulty bearing weight/no laceration/no disorientation/no dizziness/no acting out behaviors

## 2019-08-25 NOTE — ED PROVIDER NOTE - ATTENDING CONTRIBUTION TO CARE
I, Hugo Valente MD, personally saw the patient with the PA, and completed the key components of the history and physical exam. I then discussed the management plan with the PA.

## 2019-08-25 NOTE — ED PROVIDER NOTE - MUSCULOSKELETAL, MLM
+slight lumbar TP without vertebral stepoff, deformity, +right paralumbar muscle TTP, no swelling. MONTOYA x4. +neck mild TTP, no stepoff, deformity. +BL knee/patella tenderness with bruising, AFROM, joints stable, no effusionno focal swelling/tenderness

## 2019-08-25 NOTE — ED PROVIDER NOTE - ENMT, MLM
Airway patent, Nasal mucosa clear. Mucous membranes moist . Throat has no vesicles, no oropharyngeal exudates and uvula is midline. Normocephalic, atraumatic. Negative Battles. No clinical evidence of facial injury including BL orbital ridges.
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
## 2019-08-25 NOTE — ED PROVIDER NOTE - PSH
Female stress incontinence  Interstem insertion and removed 1/2017  H/O abdominoplasty  9/2016 - [facility]  H/O microdiscectomy  1/2019  H/O: hysterectomy  2007  History of augmentation of both breasts    Right knee arthroscopy 1998, left knee arthroscopy 2005 1998  S/P Ankle Ligament Repair  Left-8/20/10  S/P MARIYA-BSO  2007  Tubal Ligation (ICD9 V26.51) 1997 1997  Urinary incontinence  vaginal sling in 2011 and removed 1/2017  Uterine artery embolization, Vaginal sling 01/2007
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
## 2019-08-26 VITALS
TEMPERATURE: 98 F | HEART RATE: 70 BPM | SYSTOLIC BLOOD PRESSURE: 133 MMHG | RESPIRATION RATE: 19 BRPM | DIASTOLIC BLOOD PRESSURE: 81 MMHG | OXYGEN SATURATION: 100 %

## 2019-08-26 RX ORDER — CYCLOBENZAPRINE HYDROCHLORIDE 10 MG/1
1 TABLET, FILM COATED ORAL
Qty: 15 | Refills: 0
Start: 2019-08-26 | End: 2019-08-30

## 2019-10-15 ENCOUNTER — APPOINTMENT (OUTPATIENT)
Dept: SURGICAL ONCOLOGY | Facility: CLINIC | Age: 54
End: 2019-10-15
Payer: MEDICAID

## 2019-10-15 PROCEDURE — 99214 OFFICE O/P EST MOD 30 MIN: CPT

## 2019-10-17 ENCOUNTER — OTHER (OUTPATIENT)
Age: 54
End: 2019-10-17

## 2019-10-18 ENCOUNTER — APPOINTMENT (OUTPATIENT)
Dept: MAMMOGRAPHY | Facility: CLINIC | Age: 54
End: 2019-10-18
Payer: MEDICAID

## 2019-10-18 ENCOUNTER — OUTPATIENT (OUTPATIENT)
Dept: OUTPATIENT SERVICES | Facility: HOSPITAL | Age: 54
LOS: 1 days | End: 2019-10-18
Payer: MEDICAID

## 2019-10-18 ENCOUNTER — APPOINTMENT (OUTPATIENT)
Dept: ULTRASOUND IMAGING | Facility: CLINIC | Age: 54
End: 2019-10-18
Payer: MEDICAID

## 2019-10-18 DIAGNOSIS — Z12.31 ENCOUNTER FOR SCREENING MAMMOGRAM FOR MALIGNANT NEOPLASM OF BREAST: ICD-10-CM

## 2019-10-18 DIAGNOSIS — Z00.00 ENCOUNTER FOR GENERAL ADULT MEDICAL EXAMINATION WITHOUT ABNORMAL FINDINGS: ICD-10-CM

## 2019-10-18 DIAGNOSIS — Z98.890 OTHER SPECIFIED POSTPROCEDURAL STATES: Chronic | ICD-10-CM

## 2019-10-18 DIAGNOSIS — N39.3 STRESS INCONTINENCE (FEMALE) (MALE): Chronic | ICD-10-CM

## 2019-10-18 PROCEDURE — 76641 ULTRASOUND BREAST COMPLETE: CPT | Mod: 26,50

## 2019-10-18 PROCEDURE — 77066 DX MAMMO INCL CAD BI: CPT

## 2019-10-18 PROCEDURE — 77062 BREAST TOMOSYNTHESIS BI: CPT | Mod: 26

## 2019-10-18 PROCEDURE — 76641 ULTRASOUND BREAST COMPLETE: CPT

## 2019-10-18 PROCEDURE — G0279: CPT

## 2019-10-18 PROCEDURE — 77066 DX MAMMO INCL CAD BI: CPT | Mod: 26

## 2019-10-22 NOTE — PHYSICAL EXAM
[Normal] : supple, no neck mass and thyroid not enlarged [Normal Supraclavicular Lymph Nodes] : normal supraclavicular lymph nodes [Normal Axillary Lymph Nodes] : normal axillary lymph nodes [Normal] : oriented to person, place and time, with appropriate affect [FreeTextEntry1] : RC present for exam.  [de-identified] : Normal breast inspection and palpation of axillas. No dominant mass or nipple discharge bilaterally. Well healed left breast incision.  Small scarring at prior left breast biopsy sites.

## 2019-10-22 NOTE — CONSULT LETTER
[Dear  ___] : Dear  [unfilled], [Referral Letter:] : I am referring [unfilled] to you for further evaluation.  My most recent evaluation follows. [Please see my note below.] : Please see my note below. [Referral Closing:] : Thank you very much for seeing this patient.  If you have any questions, please do not hesitate to contact me. [Sincerely,] : Sincerely, [FreeTextEntry3] : Mateo Crowe MD, FICS, FACS\par , Surgical Oncology\par [facility]\par [address]\[place]- [address]\par \par [address]\[place], [place]- [address]\par \par (mob) [phone]\par (o) [phone]\par (f) [phone]\par

## 2019-10-22 NOTE — HISTORY OF PRESENT ILLNESS
[de-identified] : Ms. Tony is a 53 year old female who presents today for follow up breast exam.\par \par Samantha went for her annual breast imaging including mammo/sono in 2017 which revealed calcifications in the upper outer Left breast. She underwent a stereotactic biopsy which revealed Focal Atypia. Samantha states that her PCP attempted to refer her to a surgeon however could not find one who accepts her insurance. She presents today for follow up of her pathology results. She is s/p bilateral breast augmentation with saline implants in  (Dr. Patel). \par \par She went for a repeat mammo/sono on 10/26/18 which once again revealed Left breast calcifications unchanged from prior imaging. Since prior biopsy found focal atypia, surgical excision is advised (BIRADS 4). Final pathology showed fibrocystic changes, usual ductal hyperplasia and microcalcifications. During her last visit in 2018 I recommended a Left breast lumpectomy.\par \par She is now s/p left breast lumpectomy on 19 with a final path of focal DCIS intermediate grade (Er/Pr+).  She is currently taking Tamoxifen daily under the guidance of Dr. Samuels without complaint.  Deemed not necessary to undergo XRT.  Today she is without any complaints. Denies palpable breast masses, nipple discharge, skin changes, inversion or breast pain. Denies constitutional symptoms.\par \par She is is scheduled to undergo a colonoscopy with Dr. Ramos 2019.\par  \par No significant PMH.\par Denies family history of breast or ovarian cancer.\par \par Menarche: 13 y.o.\par  (Age at first pregnancy: 23)\par \par Internist: Brandon Echevarria [phone]

## 2019-10-30 ENCOUNTER — FORM ENCOUNTER (OUTPATIENT)
Age: 54
End: 2019-10-30

## 2019-10-31 ENCOUNTER — OUTPATIENT (OUTPATIENT)
Dept: OUTPATIENT SERVICES | Facility: HOSPITAL | Age: 54
LOS: 1 days | End: 2019-10-31
Payer: MEDICAID

## 2019-10-31 ENCOUNTER — APPOINTMENT (OUTPATIENT)
Dept: MRI IMAGING | Facility: CLINIC | Age: 54
End: 2019-10-31
Payer: MEDICAID

## 2019-10-31 DIAGNOSIS — N39.3 STRESS INCONTINENCE (FEMALE) (MALE): Chronic | ICD-10-CM

## 2019-10-31 DIAGNOSIS — Z98.890 OTHER SPECIFIED POSTPROCEDURAL STATES: Chronic | ICD-10-CM

## 2019-10-31 DIAGNOSIS — K76.89 OTHER SPECIFIED DISEASES OF LIVER: ICD-10-CM

## 2019-10-31 PROCEDURE — 74183 MRI ABD W/O CNTR FLWD CNTR: CPT | Mod: 26

## 2019-10-31 PROCEDURE — 74183 MRI ABD W/O CNTR FLWD CNTR: CPT

## 2019-10-31 PROCEDURE — A9585: CPT

## 2019-11-01 ENCOUNTER — EMERGENCY (EMERGENCY)
Facility: HOSPITAL | Age: 54
LOS: 0 days | Discharge: HOME | End: 2019-11-01
Attending: EMERGENCY MEDICINE
Payer: MEDICAID

## 2019-11-01 VITALS
RESPIRATION RATE: 19 BRPM | WEIGHT: 175.05 LBS | OXYGEN SATURATION: 95 % | DIASTOLIC BLOOD PRESSURE: 79 MMHG | HEART RATE: 74 BPM | TEMPERATURE: 98 F | HEIGHT: 62 IN | SYSTOLIC BLOOD PRESSURE: 123 MMHG

## 2019-11-01 DIAGNOSIS — R45.1 RESTLESSNESS AND AGITATION: ICD-10-CM

## 2019-11-01 DIAGNOSIS — Z98.890 OTHER SPECIFIED POSTPROCEDURAL STATES: Chronic | ICD-10-CM

## 2019-11-01 DIAGNOSIS — F43.20 ADJUSTMENT DISORDER, UNSPECIFIED: ICD-10-CM

## 2019-11-01 DIAGNOSIS — F32.9 MAJOR DEPRESSIVE DISORDER, SINGLE EPISODE, UNSPECIFIED: ICD-10-CM

## 2019-11-01 DIAGNOSIS — F41.9 ANXIETY DISORDER, UNSPECIFIED: ICD-10-CM

## 2019-11-01 DIAGNOSIS — N39.3 STRESS INCONTINENCE (FEMALE) (MALE): Chronic | ICD-10-CM

## 2019-11-01 PROCEDURE — 93010 ELECTROCARDIOGRAM REPORT: CPT

## 2019-11-01 PROCEDURE — 90792 PSYCH DIAG EVAL W/MED SRVCS: CPT

## 2019-11-01 PROCEDURE — 93005 ELECTROCARDIOGRAM TRACING: CPT

## 2019-11-01 PROCEDURE — 99285 EMERGENCY DEPT VISIT HI MDM: CPT

## 2019-11-01 PROCEDURE — 99283 EMERGENCY DEPT VISIT LOW MDM: CPT

## 2019-11-01 NOTE — ED BEHAVIORAL HEALTH ASSESSMENT NOTE - HPI (INCLUDE ILLNESS QUALITY, SEVERITY, DURATION, TIMING, CONTEXT, MODIFYING FACTORS, ASSOCIATED SIGNS AND SYMPTOMS)
54 year-old  female, with no prior psychiatric treatment or suicidal ideation or suicide attempt or in-patient hospitalization, presents after IRS called EMS secondary to her stating that "if she had no money life was not worth living." Patient denies meaning that is a suicidal way, stating it meaning that "you are half living if you have no money and living off welfare." Denies prior / current suicidal ideation/intent/plan. Denies depressed mood or anhedonia or hopelessness. Denies manic / psychotic symptoms. Patient is irritable and labile however, but not pressured or grandiose. No delusions elicited. Patient is linear with normal reasoning. Denies need for out-patient treatment.     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 Denies other stressors. Reports being financially stable. Refusing collateral, stating I am not crazy.

## 2019-11-01 NOTE — ED PROVIDER NOTE - CLINICAL SUMMARY MEDICAL DECISION MAKING FREE TEXT BOX
Plan: Pt was seen and cleared by psych. Declines having blood drawn. States she is not homicidal or suicidal. States she just made a statement to the IRS today that if she has no money she may as well be dead.

## 2019-11-01 NOTE — ED BEHAVIORAL HEALTH ASSESSMENT NOTE - SUMMARY
54 year-old  female, with no prior psychiatric treatment or suicidal ideation or suicide attempt or in-patient hospitalization, presents after IRS called EMS secondary to her stating that "if she had no money life was not worth living." Patient denies meaning that is a suicidal way, stating it meaning that "you are half living if you have no money and living off welfare." Denies prior / current suicidal ideation/intent/plan. Denies depressed mood or anhedonia or hopelessness. Denies manic / psychotic symptoms. Patient is irritable and labile however, but not pressured or grandiose. No delusions elicited. Patient is linear with normal reasoning. Denies need for out-patient treatment.     Patient symptoms not indicating imminent risk for harm to self; not warranting involuntary in-patient hospitalization.

## 2019-11-01 NOTE — ED ADULT NURSE NOTE - OBJECTIVE STATEMENT
pt made statements to the IRS that life wasn't worth living. pt is very agitated and angry that the IRS is trying to get 2.5million dollars from her

## 2019-11-01 NOTE — ED PROVIDER NOTE - PSH
Female stress incontinence  Interstem insertion and removed 1/2017  H/O abdominoplasty  9/2016 - [facility]  H/O microdiscectomy  1/2019  H/O: hysterectomy  2007  History of augmentation of both breasts    Right knee arthroscopy 1998, left knee arthroscopy 2005 1998  S/P Ankle Ligament Repair  Left-8/20/10  S/P MARIYA-BSO  2007  Tubal Ligation (ICD9 V26.51) 1997 1997  Urinary incontinence  vaginal sling in 2011 and removed 1/2017  Uterine artery embolization, Vaginal sling 01/2007

## 2019-11-01 NOTE — ED BEHAVIORAL HEALTH ASSESSMENT NOTE - RISK ASSESSMENT
LOW RISK     ACUTE RISK FACTORS: irritable, angry    PROTECTIVE FACTORS: no suicidal ideation/intent/plan or assault ideation/intent/plan or homicidal ideation/intent/plan    Patient symptoms not indicating imminent risk for harm to self; not warranting involuntary in-patient hospitalization Low Acute Suicide Risk

## 2019-11-01 NOTE — ED BEHAVIORAL HEALTH ASSESSMENT NOTE - SUICIDE PROTECTIVE FACTORS
Supportive social network of family or friends/Responsibility to family and others/Has future plans/Identifies reasons for living

## 2019-11-01 NOTE — ED PROVIDER NOTE - OBJECTIVE STATEMENT
Unable to see pt as psych NP is currently interviewing pt. 53 y/o female with a PMHx of anxiety, depression presents to the ED B[facility] and [facility]. Pt states she became anxious and agitated while on the phone with the IRS today, saying that if she does not have any money she may as well be dead. Denies SI, HI.

## 2019-11-01 NOTE — ED PROVIDER NOTE - PATIENT PORTAL LINK FT
You can access the FollowMyHealth Patient Portal offered by [facility] by registering at the following website: [email or web address]. By joining [employer]’s FollowMyHealth portal, you will also be able to view your health information using other applications (apps) compatible with our system.

## 2019-11-01 NOTE — ED ADULT TRIAGE NOTE - CHIEF COMPLAINT QUOTE
pt BIBEMS and SCPD #8607 c/o anxiety. pt reports talking to tax company over the phone, growing agitated regarding owing money. told company rep that she might as well end her life. endorses SI at triage to WILLARD Villanueva. 1:1 initiated for safety

## 2019-11-01 NOTE — ED ADULT TRIAGE NOTE - LOCATION:
[facility]  [address] [facility]  [address]  [place]  [address]  Tel: [phone]    April 11, 2018    Shelly Rogers  [address]   [facility] [address]        Dear Ms. Rogers,    Nayely Bravo,    This is to inform you regarding your test result.    I spoke to your daughter Sandhya and informed her about the result.  Your urine is positive for infection  I have sent a prescription of cephalexin twice a day for 3 days to the [facility] pharmacy.    If you have any further questions or problems, please contact our office.      Sincerely,    Halley Huff MD/ Cheyanne Giron CMA  Results for orders placed or performed in visit on 04/10/18   UA reflex to Microscopic   Result Value Ref Range    Color Urine Yellow     Appearance Urine Clear     Glucose Urine Negative NEG^Negative mg/dL    Bilirubin Urine Negative NEG^Negative    Ketones Urine Trace (A) NEG^Negative mg/dL    Specific Gravity Urine 1.020 1.003 - 1.035    Blood Urine Negative NEG^Negative    pH Urine 5.0 5.0 - 7.0 pH    Protein Albumin Urine Negative NEG^Negative mg/dL    Urobilinogen Urine 0.2 0.2 - 1.0 EU/dL    Nitrite Urine Negative NEG^Negative    Leukocyte Esterase Urine Trace (A) NEG^Negative    Source Midstream Urine    Urine Microscopic   Result Value Ref Range    WBC Urine 5-10 (A) OTO5^0 - 5 /HPF    RBC Urine O - 2 OTO2^O - 2 /HPF    Squamous Epithelial /LPF Urine Many (A) FEW^Few /LPF    Bacteria Urine Many (A) NEG^Negative /HPF               Enclosure: Lab Results     Left arm;

## 2019-11-19 ENCOUNTER — APPOINTMENT (OUTPATIENT)
Age: 54
End: 2019-11-19
Payer: MEDICAID

## 2019-11-19 VITALS
SYSTOLIC BLOOD PRESSURE: 119 MMHG | DIASTOLIC BLOOD PRESSURE: 77 MMHG | HEIGHT: 62 IN | RESPIRATION RATE: 16 BRPM | TEMPERATURE: 98.3 F | BODY MASS INDEX: 34.96 KG/M2 | WEIGHT: 190 LBS | HEART RATE: 65 BPM

## 2019-11-19 LAB
25(OH)D3 SERPL-MCNC: 50 NG/ML
ALBUMIN SERPL ELPH-MCNC: 4.3 G/DL
ALP BLD-CCNC: 54 U/L
ALT SERPL-CCNC: 12 U/L
ANION GAP SERPL CALC-SCNC: 14 MMOL/L
AST SERPL-CCNC: 16 U/L
BASOPHILS # BLD AUTO: 0.03 K/UL
BASOPHILS NFR BLD AUTO: 0.5 %
BILIRUB SERPL-MCNC: 0.2 MG/DL
BUN SERPL-MCNC: 11 MG/DL
CALCIUM SERPL-MCNC: 10 MG/DL
CANCER AG27-29 SERPL-ACNC: 9.4 U/ML
CHLORIDE SERPL-SCNC: 102 MMOL/L
CO2 SERPL-SCNC: 25 MMOL/L
CREAT SERPL-MCNC: 0.81 MG/DL
EOSINOPHIL # BLD AUTO: 0.03 K/UL
EOSINOPHIL NFR BLD AUTO: 0.5 %
GLUCOSE SERPL-MCNC: 155 MG/DL
HCT VFR BLD CALC: 42.3 %
HGB BLD-MCNC: 13.3 G/DL
IMM GRANULOCYTES NFR BLD AUTO: 0.6 %
LYMPHOCYTES # BLD AUTO: 2.98 K/UL
LYMPHOCYTES NFR BLD AUTO: 46.4 %
MAN DIFF?: NORMAL
MCHC RBC-ENTMCNC: 28.5 PG
MCHC RBC-ENTMCNC: 31.4 GM/DL
MCV RBC AUTO: 90.6 FL
MONOCYTES # BLD AUTO: 0.33 K/UL
MONOCYTES NFR BLD AUTO: 5.1 %
NEUTROPHILS # BLD AUTO: 3.01 K/UL
NEUTROPHILS NFR BLD AUTO: 46.9 %
PLATELET # BLD AUTO: 222 K/UL
POTASSIUM SERPL-SCNC: 4.4 MMOL/L
PROT SERPL-MCNC: 7.3 G/DL
RBC # BLD: 4.67 M/UL
RBC # FLD: 14.6 %
SODIUM SERPL-SCNC: 141 MMOL/L
WBC # FLD AUTO: 6.42 K/UL

## 2019-11-19 PROCEDURE — 99214 OFFICE O/P EST MOD 30 MIN: CPT

## 2019-11-19 RX ORDER — OXYCODONE AND ACETAMINOPHEN 5; 325 MG/1; MG/1
5-325 TABLET ORAL
Qty: 18 | Refills: 0 | Status: DISCONTINUED | COMMUNITY
Start: 2019-05-23 | End: 2019-11-19

## 2019-11-19 RX ORDER — POLYETHYLENE GLYCOL-3350 AND ELECTROLYTES 236; 6.74; 5.86; 2.97; 22.74 G/274.31G; G/274.31G; G/274.31G; G/274.31G; G/274.31G
236 POWDER, FOR SOLUTION ORAL
Qty: 4000 | Refills: 0 | Status: DISCONTINUED | COMMUNITY
Start: 2019-07-31 | End: 2019-11-19

## 2019-11-19 RX ORDER — FUROSEMIDE 20 MG/1
20 TABLET ORAL
Qty: 30 | Refills: 0 | Status: DISCONTINUED | COMMUNITY
Start: 2019-08-13 | End: 2019-11-19

## 2019-11-19 RX ORDER — ACETAMINOPHEN AND CODEINE 300; 30 MG/1; MG/1
300-30 TABLET ORAL
Qty: 50 | Refills: 0 | Status: DISCONTINUED | COMMUNITY
Start: 2019-04-29 | End: 2019-11-19

## 2019-11-19 RX ORDER — ERYTHROMYCIN 5 MG/G
5 OINTMENT OPHTHALMIC
Qty: 4 | Refills: 0 | Status: DISCONTINUED | COMMUNITY
Start: 2019-09-19 | End: 2019-11-19

## 2019-11-19 NOTE — ASSESSMENT
[FreeTextEntry1] : Ms. BILL 's questions were answered to her satisfaction. She expressed her understanding and willingness to comply with the above recommendations, and  will return to the office in 4 months.\par

## 2019-11-19 NOTE — OB HISTORY
[Currently In Menopause] : currently in menopause [Menopause Age: ____] : age at menopause was [unfilled] [___] :  Induced: [unfilled]

## 2019-11-19 NOTE — PHYSICAL EXAM
[Fully active, able to carry on all pre-disease performance without restriction] : Status 0 - Fully active, able to carry on all pre-disease performance without restriction [Normal] : normal appearance, no rash, nodules, vesicles, ulcers, erythema [de-identified] : bilateral breast augmentation; left breast scar, s/p excisional biopsy- well healed.

## 2019-11-19 NOTE — HISTORY OF PRESENT ILLNESS
[Disease: _____________________] : Disease: [unfilled] [AJCC Stage: ____] : AJCC Stage: [unfilled] [de-identified] : DCIS [de-identified] : 54  female (born in [place]), with history of supracervical hysterectomy, diagnosed with intermediate grade DCIS left breast in May 2019.\par \par CASE SYNOPSIS:\par 2016:  bilateral breast augmentation with saline implants (Dr. Patel).\par \par 11/2/17: Mammogram/sonogram revealed dense breasts indeterminate cluster of calcification in the outer upper left breast for which stereotactic biopsy recommended.\par \par 11/24/17- Stereotactic biopsy revealed fibrocystic changes with proliferative features including microcyst formation, adenosis, usual ductal hyperplasia, columnar cell change, associated microcalcification and apocrine metaplasia associated with focal atypia.\par \par 10/26/18: Mammogram- left breast calcification unchanged from prior imaging; surgical excision advised. Pathology showed fibrocystic changes, ductal hyperplasia and microcalcification.\par \par 5/23/19- left breast lumpectomy; final pathology consistent with focal DCIS, intermediate grade, ER positive CO positive, cribriform with necrosis. Margins negative.\par \par 7/23/19- evaluated by RT oncology ( Dr. Houser) -DCIS on RT score 0.8 ( low) - no need for XRT.\par \par 9/16/19- started Tamoxifen\par \par 10/17- 11/19/19- discontinued Tamoxifen due to lower extremity weakness.\par \par 10/18/19- mammogram/breast ultrasound: No suspicious mass, microcalcification, or other signs of malignancy identified. Postsurgical changes in the upper outer left breast. Stable scattered benign appearing the right breast calcification; right breast intramammary lymph node. No sonographic evidence of malignancy.\par  [de-identified] : ER positive DC positive [FreeTextEntry1] : adjuvant hormonal therapy. [de-identified] : Returning for biannual followup. Patient has taken tamoxifen for only 1 month, but did not tolerate it well, complaining of lower extremity weakness. She has discontinued Tamoxifen in mid October, and her symptoms have improved. Denies B symptoms. No changes in weight and appetite reported. Also noted “ lump” in center of her chest; repeat mammogram and breast US failed to indicate new lesions.  Recently diagnosed with right pink eye. Continues to work at [employer].

## 2019-12-10 ENCOUNTER — APPOINTMENT (OUTPATIENT)
Dept: SURGICAL ONCOLOGY | Facility: CLINIC | Age: 54
End: 2019-12-10
Payer: MEDICAID

## 2019-12-10 VITALS
BODY MASS INDEX: 34.96 KG/M2 | HEIGHT: 62 IN | HEART RATE: 67 BPM | SYSTOLIC BLOOD PRESSURE: 115 MMHG | WEIGHT: 190 LBS | DIASTOLIC BLOOD PRESSURE: 75 MMHG

## 2019-12-10 DIAGNOSIS — K76.89 OTHER SPECIFIED DISEASES OF LIVER: ICD-10-CM

## 2019-12-10 PROCEDURE — 99214 OFFICE O/P EST MOD 30 MIN: CPT

## 2019-12-10 NOTE — ASSESSMENT
[FreeTextEntry1] : 53 year old female s/p left breast lumpectomy on 5/23/19 with a final path of focal DCIS intermediate grade (Er/Pr+).  On Tamoxifen daily being followed by Dr. Samuels.  History of large hepatic cyst.  MRI Oct 2019 with stable hepatic cyst since 2017 measuring 7cm.  Annual breast imaging Oct 2019 Birads 2.  Doing well without complaint/concern.\par \par Plan:\par Annual mammo/sono Oct 2020\par Abd sonogram Oct 2020 to evaluate liver cyst\par RTO in one year after imaging completed\par Colonoscopy with Dr. Ramos- completed- melanosis coli- repeat in 5 years\par I have discussed the diagnosis, therapeutic plan and options with the patient at length. Patient expressed verbal understanding to proceed with proposed plan. All questions answered.

## 2019-12-10 NOTE — PHYSICAL EXAM
[Normal] : supple, no neck mass and thyroid not enlarged [Normal Neck Lymph Nodes] : normal neck lymph nodes  [Normal Supraclavicular Lymph Nodes] : normal supraclavicular lymph nodes [Normal] : oriented to person, place and time, with appropriate affect [de-identified] : Soft, NT, ND without palpable masses.

## 2019-12-10 NOTE — CONSULT LETTER
[Referral Letter:] : I am referring [unfilled] to you for further evaluation.  My most recent evaluation follows. [Dear  ___] : Dear  [unfilled], [Please see my note below.] : Please see my note below. [Consult Closing:] : Thank you very much for allowing me to participate in the care of this patient.  If you have any questions, please do not hesitate to contact me. [Sincerely,] : Sincerely, [FreeTextEntry3] : Mateo Crowe MD, FICS, FACS\par , Surgical Oncology\par [facility]\par [address]\[place]- [address]\par \par [address]\[place]- [address]\par \par (mob) [phone]\par (o) [phone]\par (f) [phone]\par

## 2020-01-17 DIAGNOSIS — T14.8XXA OTHER INJURY OF UNSPECIFIED BODY REGION, INITIAL ENCOUNTER: ICD-10-CM

## 2020-01-17 DIAGNOSIS — Z87.898 PERSONAL HISTORY OF OTHER SPECIFIED CONDITIONS: ICD-10-CM

## 2020-01-17 DIAGNOSIS — N32.81 OVERACTIVE BLADDER: ICD-10-CM

## 2020-01-17 DIAGNOSIS — Z09 ENCOUNTER FOR FOLLOW-UP EXAMINATION AFTER COMPLETED TREATMENT FOR CONDITIONS OTHER THAN MALIGNANT NEOPLASM: ICD-10-CM

## 2020-01-17 DIAGNOSIS — R33.9 RETENTION OF URINE, UNSPECIFIED: ICD-10-CM

## 2020-01-17 DIAGNOSIS — Z87.2 PERSONAL HISTORY OF DISEASES OF THE SKIN AND SUBCUTANEOUS TISSUE: ICD-10-CM

## 2020-01-17 DIAGNOSIS — R89.7 ABNORMAL HISTOLOGICAL FINDINGS IN SPECIMENS FROM OTHER ORGANS, SYSTEMS AND TISSUES: ICD-10-CM

## 2020-01-17 DIAGNOSIS — R06.89 OTHER ABNORMALITIES OF BREATHING: ICD-10-CM

## 2020-01-17 DIAGNOSIS — R92.1 MAMMOGRAPHIC CALCIFICATION FOUND ON DIAGNOSTIC IMAGING OF BREAST: ICD-10-CM

## 2020-01-17 DIAGNOSIS — M62.838 OTHER MUSCLE SPASM: ICD-10-CM

## 2020-01-17 DIAGNOSIS — Z87.19 PERSONAL HISTORY OF OTHER DISEASES OF THE DIGESTIVE SYSTEM: ICD-10-CM

## 2020-01-17 DIAGNOSIS — N81.9 FEMALE GENITAL PROLAPSE, UNSPECIFIED: ICD-10-CM

## 2020-01-17 DIAGNOSIS — Z12.11 ENCOUNTER FOR SCREENING FOR MALIGNANT NEOPLASM OF COLON: ICD-10-CM

## 2020-01-17 DIAGNOSIS — R10.2 PELVIC AND PERINEAL PAIN: ICD-10-CM

## 2020-01-17 RX ORDER — TAMOXIFEN CITRATE 20 MG/1
20 TABLET, FILM COATED ORAL DAILY
Qty: 30 | Refills: 1 | Status: DISCONTINUED | COMMUNITY
Start: 2019-07-26 | End: 2020-01-17

## 2020-04-10 ENCOUNTER — APPOINTMENT (OUTPATIENT)
Age: 55
End: 2020-04-10

## 2020-05-12 ENCOUNTER — EMERGENCY (EMERGENCY)
Facility: HOSPITAL | Age: 55
LOS: 0 days | Discharge: HOME | End: 2020-05-12
Payer: MEDICAID

## 2020-05-12 VITALS
DIASTOLIC BLOOD PRESSURE: 83 MMHG | HEART RATE: 84 BPM | OXYGEN SATURATION: 100 % | SYSTOLIC BLOOD PRESSURE: 126 MMHG | RESPIRATION RATE: 17 BRPM | TEMPERATURE: 99 F

## 2020-05-12 VITALS — WEIGHT: 184.97 LBS | HEIGHT: 62 IN

## 2020-05-12 DIAGNOSIS — Z98.890 OTHER SPECIFIED POSTPROCEDURAL STATES: Chronic | ICD-10-CM

## 2020-05-12 DIAGNOSIS — Z11.59 ENCOUNTER FOR SCREENING FOR OTHER VIRAL DISEASES: ICD-10-CM

## 2020-05-12 DIAGNOSIS — N39.3 STRESS INCONTINENCE (FEMALE) (MALE): Chronic | ICD-10-CM

## 2020-05-12 DIAGNOSIS — Z20.828 CONTACT WITH AND (SUSPECTED) EXPOSURE TO OTHER VIRAL COMMUNICABLE DISEASES: ICD-10-CM

## 2020-05-12 PROCEDURE — 99283 EMERGENCY DEPT VISIT LOW MDM: CPT

## 2020-05-12 PROCEDURE — U0003: CPT

## 2020-05-12 NOTE — ED STATDOCS - PHYSICAL EXAMINATION
Constitutional: NAD AAOx3. Nontoxic, well appearing. Speaking full sentences  w/o distress  Eyes: EOMI, pupils equal  Head: Normocephalic atraumatic  Mouth: no airway obstruction  Cardiac: [id number]   Resp: Lungs CTAB  GI: Abd s/nt/nd  Neuro: motor and sensory intact

## 2020-05-12 NOTE — ED ADULT NURSE NOTE - OBJECTIVE STATEMENT
Patient evaluated, treated, and discharged by PA. Refer to PA note for assessment. Discharge paperwork provided.

## 2020-05-12 NOTE — ED ADULT TRIAGE NOTE - CHIEF COMPLAINT QUOTE
pt presents to ED due to being sent by MD Leal sent pt to ED for COVID swab. pt denies any symptoms, but it due to have left shoulder surgery 5/13/2020 at outpatient surgery center.

## 2020-05-12 NOTE — ED STATDOCS - PATIENT PORTAL LINK FT
You can access the FollowMyHealth Patient Portal offered by [facility] by registering at the following website: [email or web address]. By joining [employer]’s FollowMyHealth portal, you will also be able to view your health information using other applications (apps) compatible with our system.

## 2020-05-12 NOTE — ED STATDOCS - OBJECTIVE STATEMENT
Pt here for covid testing. pt to have surgery on her shoulder tomorrow, sent in by orthopedic for covid testing. Pt has no complaints at this time. -Corbin Olsen PA-C

## 2020-05-13 LAB — SARS-COV-2 RNA SPEC QL NAA+PROBE: SIGNIFICANT CHANGE UP

## 2020-05-16 ENCOUNTER — APPOINTMENT (OUTPATIENT)
Dept: DISASTER EMERGENCY | Facility: CLINIC | Age: 55
End: 2020-05-16

## 2020-05-16 DIAGNOSIS — Z01.818 ENCOUNTER FOR OTHER PREPROCEDURAL EXAMINATION: ICD-10-CM

## 2020-05-17 LAB — SARS-COV-2 N GENE NPH QL NAA+PROBE: NOT DETECTED

## 2020-09-09 NOTE — ASU PREOP CHECKLIST - PATIENT PROBLEMS/NEEDS
This medication refill is regarding an electronic request from [facility] outpatient pharmacy:    Requested Prescriptions     Pending Prescriptions Disp Refills    traZODone (DESYREL) 50 MG tablet [Pharmacy Med Name: traZODone 50MG TAB] 30 tablet 0     Sig: TAKE 1 TABLET BY MOUTH NIGHTLY AS NEEDED FOR SLEEP.  furosemide (LASIX) 40 MG tablet [Pharmacy Med Name: FUROSEMIDE 40MG TABS] 30 tablet 1     Sig: TAKE 1 TABLET BY MOUTH DAILY. Date of last visit: 9/2/2020  Date of next visit: none  Date of last refill: Trazodone 8/10/20 for #30 no refill, Lasix 7/15/20 for #30 with 1 refill     Spoke to the pt and she stated that she is only taking Lasix PRN at this time so doesn't need it refilled but does need Trazodone. Rx for Trazodone, verified, ordered and set to EP. Rx for Lasix refused. Patient expressed no known problems or needs

## 2020-09-21 NOTE — ASU PATIENT PROFILE, ADULT - NS SC CAGE ALCOHOL ANNOYED YOU
Procedures       [facility] - NEUROLOGY  [facility] [place]    Date: September 21, 2020   Patient Name: Yolanda Win   MRN: [id number]   PCP: Cruzito Craven  Referring Provider: Dillon Gonzalez MD    Assessment:      This is Yolanda Win, 32 y.o. female with chronic migraines (G43.719) and suffers from headaches more than 15 days a month lasting more than 4 hours a day with no relief of symptoms despite trying multiple medications listed below.    Plan:      -  Continue TPM 200mg daily, imitrex prn  -  Follow up for botox   -  Continue Mg       I discussed side effects of the medications. I asked the patient to  stop the medication if he notices serious adverse effects as we discussed and to seek immediate medical attention at an ER.     Dillon Gonzalez MD  [facility]   Department of Neurology    Subjective:   -  HA well controlled with combination of TPM and botox  -  Completed chemotherapy but has not started radiation, coping well with stress    6/2020  -  Recent diagnosis of breast cancer, s/p chemo therapy with upcoming surgery and radiation  -  Development of neuropathy pain in feet, excess sedation with GBP 300mg tid, pain/altered sensation in fingers of right hand for the past day  -  Previously unable to tolerate cymbalta  12/2019  EXCELLENT response to botox, estimates 1 headache per week  9/2019  Continued daily headaches, weaning breast feeding - son 6 months old  8/2019  Unable to tolerate increased celexa due to fatigue, compliant with Mg with continued daily HA, unable to resume TPM as continuing to breast feed    HPI 5/2019:   Ms. Yolanda Win is a 32 y.o. female who presents with a chief complaint of headaches    Patient has had significant difficulty with migraines since she was 9 years old with worsening during recent pregnancy.  Associated nausea, photo/phonophobia.  She has had difficulty with post-partum  anxiety and started celexa a month ago with some improvement in headache as well as irritability noted.    Prior medications trials as below  TPM - partial relief at 150mg /d  Pamelor, Neurontin, Seroquel - all little effect  Imitrex, phenergan - good effect  Fioricet, zanaflex - little effect    PAST MEDICAL HISTORY:  Past Medical History:   Diagnosis Date    Abnormal Pap smear of cervix     ADHD     Allergy     seasonal    Anorexia     Anxiety     Bulimia     Depression     Fever blister     History of posttraumatic stress disorder (PTSD)     Hypertension     Gestational Hypertension    Invasive ductal carcinoma of right breast 2019    Mastitis     Migraine headache        PAST SURGICAL HISTORY:  Past Surgical History:   Procedure Laterality Date    BILATERAL MASTECTOMY Bilateral 2020    Procedure: MASTECTOMY, BILATERAL - BILATERAL SKIN SPARING MASTECTOMY;  Surgeon: Percy Ayers MD;  Location: [facility];  Service: General;  Laterality: Bilateral;    BIOPSY OF AXILLARY NODE Right 2020    Procedure: BIOPSY, LYMPH NODE, AXILLARY;  Surgeon: Percy Ayers MD;  Location: [facility];  Service: General;  Laterality: Right;    BONE MARROW ASPIRATION      x 3    BREAST SURGERY      CERVICAL BIOPSY  W/ LOOP ELECTRODE EXCISION       SECTION  2016     SECTION N/A 2019    Procedure:  SECTION;  Surgeon: Kirit Hernandez MD;  Location: [facility] [place] [facility];  Service: OB/GYN;  Laterality: N/A;    COLPOSCOPY      INJECTION FOR SENTINEL NODE IDENTIFICATION Right 2020    Procedure: INJECTION, FOR SENTINEL NODE IDENTIFICATION;  Surgeon: Percy Ayers MD;  Location: [facility];  Service: General;  Laterality: Right;    INSERTION OF BREAST TISSUE EXPANDER Bilateral 2020    Procedure: INSERTION, TISSUE EXPANDER, BREAST;  Surgeon: Kiko Aranda MD;  Location: [facility];  Service: Plastics;  Laterality: Bilateral;    INSERTION OF TUNNELED CENTRAL VENOUS  CATHETER (CVC) WITH SUBCUTANEOUS PORT Left 12/27/2019    Procedure: KYOUOVICI-[id number]-Z-CATH-Left neck or chest wall;  Surgeon: Percy Ayers MD;  Location: [facility] OR 2ND FLR;  Service: General;  Laterality: Left;    MEDIPORT REMOVAL N/A 7/1/2020    Procedure: REMOVAL, CATHETER, CENTRAL VENOUS, TUNNELED, WITH PORT;  Surgeon: Percy Ayers MD;  Location: [facility] OR;  Service: General;  Laterality: N/A;    SHOULDER SURGERY Left     x 2    SINUS SURGERY      age 16    TONSILLECTOMY         CURRENT MEDS:  Current Outpatient Medications   Medication Sig Dispense Refill    albuterol (PROVENTIL HFA) 90 mcg/actuation inhaler Inhale 2 puffs by mouth into the lungs every 6 (six) hours as needed for Wheezing. Rescue 18 g 0    ALPRAZolam (XANAX) 0.25 MG tablet Take 1 tablet (0.25 mg total) by mouth daily as needed for Anxiety. 30 tablet 0    citalopram (CELEXA) 40 MG tablet Take 1 tablet (40 mg total) by mouth once daily. 30 tablet 0    clindamycin (CLEOCIN) 300 MG capsule Take 1 capsule (300 mg total) by mouth 2 (two) times a day. 20 capsule 0    dextroamphetamine-amphetamine (ADDERALL XR) 25 MG 24 hr capsule Take 1 capsule (25 mg total) by mouth every morning. 30 capsule 0    ferrous sulfate 324 mg (65 mg iron) TbEC Take 325 mg by mouth once daily.      loratadine (CLARITIN) 10 mg tablet Take 10 mg by mouth once daily.      magic mouthwash diphen/antac/lidoc/nysta Take 10 mLs by mouth 4 (four) times daily. 120 mL 0    multivitamin (ONE DAILY MULTIVITAMIN) per tablet Take 1 tablet by mouth once daily.      multivitamin with minerals (HAIR,SKIN AND NAILS ORAL) Take by mouth.      mupirocin (BACTROBAN) 2 % ointment Apply to affected area as directed. 22 g 0    OLANZapine (ZYPREXA) 5 MG tablet Take 1 tablet (5 mg total) by mouth every 6 (six) hours as needed. Nausea 30 tablet 2    ondansetron (ZOFRAN-ODT) 4 MG TbDL Dissolve 1 tablet (4 mg total) by mouth every 8 (eight) hours as needed. 30 tablet 1     oxyCODONE (ROXICODONE) 5 MG immediate release tablet Take 1 tablet (5 mg total) by mouth every 8 (eight) hours as needed for Pain. 60 tablet 0    oxyCODONE-acetaminophen (PERCOCET) 7.5-325 mg per tablet Take 1-2 tablets by mouth every 4 to 6 hours as needed for pain. 28 tablet 0    prasterone, dhea, (INTRAROSA) 6.5 mg Inst Place 6.5 mg vaginally every evening. 30 each 11    promethazine (PHENERGAN) 12.5 MG Tab Take 1 tablet (12.5 mg total) by mouth every 6 (six) hours as needed. 30 tablet 1    sulfamethoxazole-trimethoprim 800-160mg (BACTRIM DS) 800-160 mg Tab       sumatriptan (IMITREX) 100 MG tablet Take 1/2 to 1 tab at onset of headache.  If no improvement in 2 hours, take another.  Do not take more than 2 in 24 hours. 9 tablet 11    topiramate (TOPAMAX) 100 MG tablet Take 2 tablets (200 mg total) by mouth every evening. 180 tablet 3    tranexamic acid (LYSTEDA) 650 mg tablet Take 2 tablets (1,300 mg total) by mouth 3 (three) times daily. 180 tablet 0    traZODone (DESYREL) 50 MG tablet Take 1 tablet (50 mg total) by mouth every evening. (Patient taking differently: Take 25 mg by mouth every evening. ) 30 tablet 0    triamcinolone acetonide 0.1% (KENALOG) 0.1 % cream apply to affected area twice daily 454 g 3    valACYclovir (VALTREX) 1000 MG tablet Take 1 tablet (1,000 mg total) by mouth every 12 (twelve) hours. (Patient taking differently: Take 1,000 mg by mouth every 12 (twelve) hours. Patient uses prn) 60 tablet 0    vitamin D (VITAMIN D3) 1000 units Tab Take 1,000 Units by mouth once daily.       Current Facility-Administered Medications   Medication Dose Route Frequency Provider Last Rate Last Dose    copper intrauterine device 380 square mm  mm  380 mm Intrauterine  Kaleigh Polanco MD   380 mm at 01/23/20 1400    onabotulinumtoxina injection 200 Units  200 Units Intramuscular Q90 Days Dillon Gonzalez MD   200 Units at 06/17/20 1522       ALLERGIES:  Review of patient's  "allergies indicates:   Allergen Reactions    Amoxicillin Hives    Penicillins Hives and Rash    Diazepam Anxiety and Other (See Comments)     "makes hyper"       FAMILY HISTORY:  Family History   Problem Relation Age of Onset    Cancer Maternal Grandmother 40        cervical    Cervical cancer Mother     Breast cancer Other     Ovarian cancer Other     Colon cancer Neg Hx     Melanoma Neg Hx        SOCIAL HISTORY:  Social History     Tobacco Use    Smoking status: Never Smoker    Smokeless tobacco: Never Used   Substance Use Topics    Alcohol use: Yes     Frequency: 2-3 times a week     Drinks per session: 1 or 2     Binge frequency: Monthly     Comment: socially    Drug use: No       Review of Systems:  12 review of systems is negative except for the symptoms mentioned in HPI.        Objective:     There were no vitals filed for this visit.    General: NAD, well nourished   Eyes: no tearing, discharge, no erythema   ENT: moist mucous membranes of the oral cavity, nares patent    Neck: Supple, full range of motion  Cardiovascular: Warm and well perfused, pulses equal and symmetrical  Lungs: Normal work of breathing, normal chest wall excursions  Skin: No rash, lesions, or breakdown on exposed skin  Psychiatry: Mood and affect are appropriate   Abdomen: soft, non tender, non distended  Extremeties: No cyanosis, clubbing or edema.    Neurological   MENTAL STATUS: Alert and oriented to person, place, and time. Attention and concentration within normal limits. Speech without dysarthria, able to name and repeat without difficulty. Recent and remote memory within normal limits   CRANIAL NERVES: Visual fields intact. PERRL. EOMI. Facial sensation intact. Face symmetrical. Hearing grossly intact. Full shoulder shrug bilaterally. Tongue protrudes midline   SENSORY: Sensation is intact to light touch throughout.   MOTOR: Normal bulk and tone. No pronator drift.    CEREBELLAR/COORDINATION/GAIT: Gait steady with " normal arm swing and stride length.     BOTOX was performed as an indicated therapy for intractable chronic migraine headaches given that the patient failed several prophylactic medications    Botulinum Toxin Injection Procedure   Pre-operative diagnosis: Chronic migraine   Post-operative diagnosis: Same   Procedure: Chemical neurolysis   After risks and benefits were explained including bleeding, infection, worsening of pain, damage to the areas being injected, weakness of muscles, loss of muscle control, dysphagia if injecting the head or neck, facial droop if injecting the facial area, painful injection, allergic or other reaction to the medications being injected, and the failure of the procedure to help the problem, a signed consent was obtained.   The patient was placed in a comfortable area and the sites to be treated were identified.The area to be treated was prepped three times with alcohol and the alcohol allowed to dry. Next, a 30 gauge needle was used to inject the medication in the area to be treated.       Botox 100 units NDC 3335-1856-68    Area(s) injected:   Total Botox used: 155 Units   Botox wastage: 45 Units   Injection sites:    muscle bilaterally ( a total of 10 units divided into 2 sites)   Procerus muscle (5 units)   Frontalis muscle bilaterally (a total of 20 units divided into 4 sites)   Temporalis muscle bilaterally (a total of 40 units divided into 8 sites)   Occipitalis muscle bilaterally (a total of 30 units divided into 6 sites)   Cervical paraspinal muscles (a total of 20 units divided into 4 sites)   Trapezius muscle bilaterally (a total of 30 units divided into 6 sites)   Complications: none   RTC for the next Botox injection: 3 months              no

## 2020-09-27 NOTE — ED ADULT NURSE NOTE - CHIEF COMPLAINT QUOTE
Patient states she is unable to void at this time. pt BIB[employer]S and [employer] #1955 c/o anxiety. pt reports talking to tax company over the phone, growing agitated regarding owing money. told company rep that she might as well end her life. endorses SI at triage to WILLARD Villanueva. 1:1 initiated for safety

## 2020-10-27 ENCOUNTER — RESULT REVIEW (OUTPATIENT)
Age: 55
End: 2020-10-27

## 2020-10-27 ENCOUNTER — APPOINTMENT (OUTPATIENT)
Dept: MAMMOGRAPHY | Facility: IMAGING CENTER | Age: 55
End: 2020-10-27
Payer: MEDICAID

## 2020-10-27 ENCOUNTER — APPOINTMENT (OUTPATIENT)
Dept: ULTRASOUND IMAGING | Facility: IMAGING CENTER | Age: 55
End: 2020-10-27
Payer: MEDICAID

## 2020-10-27 ENCOUNTER — OUTPATIENT (OUTPATIENT)
Dept: OUTPATIENT SERVICES | Facility: HOSPITAL | Age: 55
LOS: 1 days | End: 2020-10-27
Payer: MEDICAID

## 2020-10-27 DIAGNOSIS — Z98.890 OTHER SPECIFIED POSTPROCEDURAL STATES: Chronic | ICD-10-CM

## 2020-10-27 DIAGNOSIS — N39.3 STRESS INCONTINENCE (FEMALE) (MALE): Chronic | ICD-10-CM

## 2020-10-27 DIAGNOSIS — K76.89 OTHER SPECIFIED DISEASES OF LIVER: ICD-10-CM

## 2020-10-27 PROCEDURE — 77066 DX MAMMO INCL CAD BI: CPT | Mod: 26

## 2020-10-27 PROCEDURE — 76700 US EXAM ABDOM COMPLETE: CPT | Mod: 26

## 2020-10-27 PROCEDURE — G0279: CPT

## 2020-10-27 PROCEDURE — 76641 ULTRASOUND BREAST COMPLETE: CPT | Mod: 26,50

## 2020-10-27 PROCEDURE — 77062 BREAST TOMOSYNTHESIS BI: CPT | Mod: 26

## 2020-10-27 PROCEDURE — 76700 US EXAM ABDOM COMPLETE: CPT

## 2020-10-27 PROCEDURE — 76641 ULTRASOUND BREAST COMPLETE: CPT

## 2020-10-27 PROCEDURE — 77066 DX MAMMO INCL CAD BI: CPT

## 2020-12-15 ENCOUNTER — APPOINTMENT (OUTPATIENT)
Dept: SURGICAL ONCOLOGY | Facility: CLINIC | Age: 55
End: 2020-12-15
Payer: MEDICAID

## 2020-12-15 VITALS
DIASTOLIC BLOOD PRESSURE: 80 MMHG | WEIGHT: 190 LBS | HEART RATE: 96 BPM | HEIGHT: 62 IN | SYSTOLIC BLOOD PRESSURE: 120 MMHG | BODY MASS INDEX: 34.96 KG/M2

## 2020-12-15 PROCEDURE — 99072 ADDL SUPL MATRL&STAF TM PHE: CPT

## 2020-12-15 PROCEDURE — 99214 OFFICE O/P EST MOD 30 MIN: CPT

## 2020-12-21 PROBLEM — Z01.419 ENCOUNTER FOR ANNUAL ROUTINE GYNECOLOGICAL EXAMINATION: Status: RESOLVED | Noted: 2019-08-01 | Resolved: 2020-12-21

## 2020-12-21 NOTE — ADDENDUM
[FreeTextEntry1] : I, Yamile Wong, acted solely as a scribe for Dr. Mateo Crowe on this date 12/15/2020

## 2020-12-21 NOTE — ASSESSMENT
[FreeTextEntry1] : 53 year old female s/p left breast lumpectomy on 5/23/19 with a final path of focal DCIS intermediate grade (Er/Pr+).  On Tamoxifen daily being followed by Dr. Samuels.  History of large hepatic cyst.  MRI Oct 2019 with stable hepatic cyst since 2017 measuring 7cm.  Annual breast imaging Oct 2019 Birads 2.  Doing well without complaint/concern.\par \par Plan:\par Annual mammo/sono Oct 2021\par Abd sonogram Oct 2021 to evaluate liver cyst\par RTO in one year after imaging completed\par I have discussed the diagnosis, therapeutic plan and options with the patient at length. Patient expressed verbal understanding to proceed with proposed plan. All questions answered.

## 2020-12-21 NOTE — PHYSICAL EXAM
[Normal] : supple, no neck mass and thyroid not enlarged [Normal Neck Lymph Nodes] : normal neck lymph nodes  [Normal Supraclavicular Lymph Nodes] : normal supraclavicular lymph nodes [Normal] : oriented to person, place and time, with appropriate affect [FreeTextEntry1] : KN present during exam  [de-identified] : bilateral implants w/ hypertrophic scar on right breast, No palpable mass in either breast, bilateral nipple/areolar complex- insignificant, no palpable lymphadenopathy in bilateral axilla and/or neck. A certified chaperone present during my exam at all times.  [de-identified] : Soft, NT, ND without palpable masses.

## 2020-12-21 NOTE — HISTORY OF PRESENT ILLNESS
[de-identified] : Ms. Tony is a 55 year old female who presents today for follow up exam for DCIS and liver cyst. She completed her colonoscopy 19 revealing melanosis and hemorrhoids. Recent MMG/US bilateral breast 10/27/20 (BIRADS 2): No mammographic or sonographic evidence of malignancy. No evidence of changes of a suspicious nature since the prior images. US abdomen complete 10/27/20: Simple appearing right hepatic cyst, unchanged.\par \par 12/15/20: Today the pt was w/o any complaints. Denies any pain and denies any irregular eating habits. Denies constitutional symptoms. \par \par Pertinent Hx:\par Samantha went for her annual breast imaging including mammo/sono in  which revealed calcifications in the upper outer Left breast. She underwent a stereotactic biopsy which revealed Focal Atypia. Samantha states that her PCP attempted to refer her to a surgeon however could not find one who accepts her insurance. She presents today for follow up of her pathology results. She is s/p bilateral breast augmentation with saline implants in  (Dr. Patel). \par \par She went for a repeat mammo/sono on 10/26/18 which once again revealed Left breast calcifications unchanged from prior imaging. Since prior biopsy found focal atypia, surgical excision is advised (BIRADS 4). Final pathology showed fibrocystic changes, usual ductal hyperplasia and microcalcifications. \par \par Previously, she is s/p left breast lumpectomy on 19 with a final path of focal DCIS intermediate grade (Er/Pr+).  She is currently taking Tamoxifen daily under the guidance of Dr. Samuels without complaint.  Deemed not necessary to undergo XRT.  She underwent a b/l mammo/sono 10/31/19 which was without evidence of malignancy (Birads 2). \par \par She underwent a MRI abdomen on 10/31/19 to evaluate her liver cyst which was noted to be stable at 7cm since May 2017.  She denies abdominal discomfort.  Today she is feeling generally well without complaint or concern.\par  \par No significant PMH.\par Denies family history of breast or ovarian cancer.\par \par Menarche: 13 y.o.\par  (Age at first pregnancy: 23)\par \par Internist: Brandon Echevarria [phone]

## 2020-12-21 NOTE — CONSULT LETTER
[Please see my note below.] : Please see my note below. [Consult Closing:] : Thank you very much for allowing me to participate in the care of this patient.  If you have any questions, please do not hesitate to contact me. [Sincerely,] : Sincerely, [Dear  ___] : Dear  [unfilled], [Courtesy Letter:] : I had the pleasure of seeing your patient, [unfilled], in my office today. [FreeTextEntry3] : Mateo Crowe MD, FICS, FACS\par , Surgical Oncology\par [facility]\par [address]\[place]- [address]\par \par [address]\[place], [place]- [address]\par \par (mob) [phone]\par (o) [phone]\par (f) [phone]\par

## 2021-03-11 ENCOUNTER — APPOINTMENT (OUTPATIENT)
Age: 56
End: 2021-03-11
Payer: MEDICAID

## 2021-03-11 ENCOUNTER — RESULT REVIEW (OUTPATIENT)
Age: 56
End: 2021-03-11

## 2021-03-11 ENCOUNTER — OUTPATIENT (OUTPATIENT)
Dept: OUTPATIENT SERVICES | Facility: HOSPITAL | Age: 56
LOS: 1 days | Discharge: HOME | End: 2021-03-11

## 2021-03-11 VITALS
DIASTOLIC BLOOD PRESSURE: 66 MMHG | HEIGHT: 62 IN | TEMPERATURE: 97.3 F | HEART RATE: 79 BPM | RESPIRATION RATE: 16 BRPM | SYSTOLIC BLOOD PRESSURE: 99 MMHG | WEIGHT: 188 LBS | BODY MASS INDEX: 34.6 KG/M2

## 2021-03-11 DIAGNOSIS — Z98.890 OTHER SPECIFIED POSTPROCEDURAL STATES: Chronic | ICD-10-CM

## 2021-03-11 DIAGNOSIS — N39.3 STRESS INCONTINENCE (FEMALE) (MALE): Chronic | ICD-10-CM

## 2021-03-11 DIAGNOSIS — D05.12 INTRADUCTAL CARCINOMA IN SITU OF LEFT BREAST: ICD-10-CM

## 2021-03-11 LAB
BASOPHILS # BLD AUTO: 0.03 K/UL — SIGNIFICANT CHANGE UP (ref 0–0.2)
BASOPHILS NFR BLD AUTO: 0.4 % — SIGNIFICANT CHANGE UP (ref 0–2)
EOSINOPHIL # BLD AUTO: 0.03 K/UL — SIGNIFICANT CHANGE UP (ref 0–0.5)
EOSINOPHIL NFR BLD AUTO: 0.4 % — SIGNIFICANT CHANGE UP (ref 0–6)
HCT VFR BLD CALC: 39.9 % — SIGNIFICANT CHANGE UP (ref 34.5–45)
HGB BLD-MCNC: 13 G/DL — SIGNIFICANT CHANGE UP (ref 11.5–15.5)
IMM GRANULOCYTES NFR BLD AUTO: 0.1 % — SIGNIFICANT CHANGE UP (ref 0–1.5)
LYMPHOCYTES # BLD AUTO: 2.67 K/UL — SIGNIFICANT CHANGE UP (ref 1–3.3)
LYMPHOCYTES # BLD AUTO: 33 % — SIGNIFICANT CHANGE UP (ref 13–44)
MCHC RBC-ENTMCNC: 28.9 PG — SIGNIFICANT CHANGE UP (ref 27–34)
MCHC RBC-ENTMCNC: 32.6 GM/DL — SIGNIFICANT CHANGE UP (ref 32–36)
MCV RBC AUTO: 88.7 FL — SIGNIFICANT CHANGE UP (ref 80–100)
MONOCYTES # BLD AUTO: 0.46 K/UL — SIGNIFICANT CHANGE UP (ref 0–0.9)
MONOCYTES NFR BLD AUTO: 5.7 % — SIGNIFICANT CHANGE UP (ref 2–14)
NEUTROPHILS # BLD AUTO: 4.9 K/UL — SIGNIFICANT CHANGE UP (ref 1.8–7.4)
NEUTROPHILS NFR BLD AUTO: 60.4 % — SIGNIFICANT CHANGE UP (ref 43–77)
NRBC # BLD: 0 /100 WBCS — SIGNIFICANT CHANGE UP (ref 0–0)
PLATELET # BLD AUTO: 196 K/UL — SIGNIFICANT CHANGE UP (ref 150–400)
RBC # BLD: 4.5 M/UL — SIGNIFICANT CHANGE UP (ref 3.8–5.2)
RBC # FLD: 14.2 % — SIGNIFICANT CHANGE UP (ref 10.3–14.5)
WBC # BLD: 8.1 K/UL — SIGNIFICANT CHANGE UP (ref 3.8–10.5)
WBC # FLD AUTO: 8.1 K/UL — SIGNIFICANT CHANGE UP (ref 3.8–10.5)

## 2021-03-11 PROCEDURE — 99214 OFFICE O/P EST MOD 30 MIN: CPT

## 2021-03-11 NOTE — ASSESSMENT
[FreeTextEntry1] : Ms. BILL 's questions were answered to her satisfaction. She expressed her understanding and willingness to comply with the above recommendations, and  will return to the office in 6 months.\par

## 2021-03-11 NOTE — HISTORY OF PRESENT ILLNESS
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 Patel).\par \par 11/2/17: Mammogram/sonogram revealed dense breasts indeterminate cluster of calcification in the outer upper left breast for which stereotactic biopsy recommended.\par \par 11/24/17- Stereotactic biopsy revealed fibrocystic changes with proliferative features including microcyst formation, adenosis, usual ductal hyperplasia, columnar cell change, associated microcalcification and apocrine metaplasia associated with focal atypia.\par \par 10/26/18: Mammogram- left breast calcification unchanged from prior imaging; surgical excision advised. Pathology showed fibrocystic changes, ductal hyperplasia and microcalcification.\par \par 5/23/19- left breast lumpectomy; final pathology consistent with focal DCIS, intermediate grade, ER positive MI positive, cribriform with necrosis. Margins negative.\par \par 7/23/19- evaluated by RT oncology ( Dr. Houser) -DCIS on RT score 0.8 ( low) - no need for XRT.\par \par 9/16/19- started Tamoxifen\par \par 10/17- 11/19/19- discontinued Tamoxifen due to lower extremity weakness.\par \par 10/18/19- mammogram/breast ultrasound: No suspicious mass, microcalcification, or other signs of malignancy identified. Postsurgical changes in the upper outer left breast. Stable scattered benign appearing the right breast calcification; right breast intramammary lymph node. No sonographic evidence of malignancy.\par \par 10/27/2020: Bilateral breast ultrasound/mammogram–no mammogram or sonogram evidence of malignancy.  [de-identified] : DCIS [de-identified] : ER positive DE positive [FreeTextEntry1] : adjuvant hormonal therapy. [de-identified] : Here for oncologic follow up; last seen in office in November 2019. Ms. BILL is doing well, complies with adjuvant hormonal treatment (Tamoxifen started September 2019, initially at 20 mg/day, soon thereafter decreased to 10 mg a day due to side effects).  Last mammogram/breast ultrasound from October 2020 consistent with no evidence of recurrent malignancy.  She is endorsing minimal side effects from tamoxifen, continues to be active, denies venous thrombotic events or vaginal discharge. Laboratory tests consistent with normal CBC; pending serum tumor marker (CA 27- 29). Deferring covid vaccination for now.

## 2021-03-11 NOTE — PHYSICAL EXAM
[Fully active, able to carry on all pre-disease performance without restriction] : Status 0 - Fully active, able to carry on all pre-disease performance without restriction [Normal] : normal appearance, no rash, nodules, vesicles, ulcers, erythema [de-identified] : bilateral breast augmentation; left breast scar, s/p excisional biopsy- well healed.

## 2021-03-11 NOTE — RESULTS/DATA
[FreeTextEntry1] : I reviewed recent + today's blood work results with patient.\par \par \par \par \par \par \par \par

## 2021-03-12 LAB
24R-OH-CALCIDIOL SERPL-MCNC: 47.1 NG/ML — SIGNIFICANT CHANGE UP (ref 30–80)
ALBUMIN SERPL ELPH-MCNC: 4.2 G/DL — SIGNIFICANT CHANGE UP (ref 3.3–5)
ALP SERPL-CCNC: 61 U/L — SIGNIFICANT CHANGE UP (ref 40–120)
ALT FLD-CCNC: 17 U/L — SIGNIFICANT CHANGE UP (ref 10–45)
ANION GAP SERPL CALC-SCNC: 12 MMOL/L — SIGNIFICANT CHANGE UP (ref 5–17)
AST SERPL-CCNC: 15 U/L — SIGNIFICANT CHANGE UP (ref 10–40)
BILIRUB SERPL-MCNC: 0.2 MG/DL — SIGNIFICANT CHANGE UP (ref 0.2–1.2)
BUN SERPL-MCNC: 15 MG/DL — SIGNIFICANT CHANGE UP (ref 7–23)
CALCIUM SERPL-MCNC: 9.4 MG/DL — SIGNIFICANT CHANGE UP (ref 8.4–10.5)
CANCER AG27-29 SERPL-ACNC: 10.3 U/ML — SIGNIFICANT CHANGE UP (ref 0–38.6)
CHLORIDE SERPL-SCNC: 103 MMOL/L — SIGNIFICANT CHANGE UP (ref 96–108)
CO2 SERPL-SCNC: 25 MMOL/L — SIGNIFICANT CHANGE UP (ref 22–31)
CREAT SERPL-MCNC: 1.03 MG/DL — SIGNIFICANT CHANGE UP (ref 0.5–1.3)
GLUCOSE SERPL-MCNC: 169 MG/DL — HIGH (ref 70–99)
POTASSIUM SERPL-MCNC: 4 MMOL/L — SIGNIFICANT CHANGE UP (ref 3.5–5.3)
POTASSIUM SERPL-SCNC: 4 MMOL/L — SIGNIFICANT CHANGE UP (ref 3.5–5.3)
PROT SERPL-MCNC: 7.1 G/DL — SIGNIFICANT CHANGE UP (ref 6–8.3)
SODIUM SERPL-SCNC: 139 MMOL/L — SIGNIFICANT CHANGE UP (ref 135–145)

## 2021-03-18 NOTE — PROGRESS NOTE ADULT - MINUTES
Radha Hollingsworth is a 79 y.o. male who presents to the office today with chief complaint of thick, painful nails to both feet. Chief Complaint   Patient presents with    Nail Problem     b/l nail trim/ last seen Dr. Seth Staton 10/15/2021    Callouses     right foot     Other     not diabetic    Symptoms began greater than 1 year(s) ago. Patient denies injury to the feet. Patient states that his nails are painful with shoe gear and ambulation. Pain is rated 5 out of 10 at it's worst and is described as intermittent. Treatments prior to today's visit include: None. No Known Allergies    Past Medical History:   Diagnosis Date    [facility]     CVA (cerebral vascular accident) ([facility] [address] [facility]     Myocardial infarct, old        Prior to Admission medications    Medication Sig Start Date End Date Taking?  Authorizing Provider   FLUoxetine (PROZAC) 20 MG capsule Take 1 capsule by mouth daily 10/24/20  Yes Carmelita Gonzalez MD   metoprolol succinate (TOPROL XL) 25 MG extended release tablet Take 1 tablet by mouth daily 10/24/20  Yes Carmelita Gonzalez MD   furosemide (LASIX) 20 MG tablet Take 1 tablet by mouth daily 10/24/20  Yes Carmelita Gonzalez MD   lisinopril (PRINIVIL;ZESTRIL) 2.5 MG tablet Take 1 tablet by mouth daily 9/26/20  Yes Rosalina Hall MD   apixaban (ELIQUIS) 5 MG TABS tablet Take 1 tablet by mouth 2 times daily 4/20/20  Yes Elissa Boggs MD   aspirin 81 MG chewable tablet Take 1 tablet by mouth daily 4/21/20  Yes Elissa Boggs MD   atorvastatin (LIPITOR) 80 MG tablet Take 80 mg by mouth daily 1/1/20  Yes Historical Provider, MD   buPROPion [facility]) 150 MG extended release tablet Take 150 mg by mouth 2 times daily 2/16/20  Yes Historical Provider, MD   levothyroxine (SYNTHROID) 75 MCG tablet Take 75 mcg by mouth daily 8/23/19  Yes Historical Provider, MD       Past Surgical History:   Procedure Laterality Date    CARDIOVERSION N/A 9/24/2020    CARDIOVERSION performed by Ligia Lomeli MD at [facility] OR       No family history on file. Social History     Tobacco Use    Smoking status: Never Smoker    Smokeless tobacco: Never Used   Substance Use Topics    Alcohol use: Not on file       Review of Systems   Constitutional: Negative for activity change, appetite change, chills, diaphoresis, fatigue and fever. Respiratory: Negative for shortness of breath. Cardiovascular: Negative for leg swelling. Gastrointestinal: Negative for diarrhea and nausea. Endocrine: Negative for cold intolerance, heat intolerance and polyuria. Musculoskeletal: Positive for arthralgias. Negative for back pain, gait problem, joint swelling and myalgias. Skin: Negative for color change, pallor, rash and wound. Allergic/Immunologic: Negative for environmental allergies and food allergies. Neurological: Negative for dizziness, weakness, light-headedness and numbness. Hematological: Does not bruise/bleed easily. Psychiatric/Behavioral: Negative for behavioral problems, confusion and self-injury. The patient is not nervous/anxious. Vitals: There were no vitals filed for this visit. General: AAO x 3 in NAD. Integument: There are no rashes, ulcers, or breaks in the skin noted to the bilateral lower extremities. There is no induration, subcutaneous nodules, or tightening of the skin noted to the bilateral.     Toenails 1-5 of the right foot do present with thickness, elongation, discoloration, brittleness, subungual debris. Toenails 1-5 of the left foot do not present with thickness, elongation, discoloration, brittleness, subungual debris. There is pain with palpation and debridement of toenails 1-5 of the right foot and 1-5 of the left foot. Interdigital maceration absent to web spaces 1-4, Bilateral.     There are preulcerative lesions noted to the right foot submetatarsal head one. There is pain with direct palpation of this lesion.  Debridement of this lesion with a fifteen blade reveals a central core. This core was also debrided with a fifteen blade. No signs of bacterial infection are noted to this lesion. There are no preulcerative lesions noted to the left foot. The skin to the bilateral feet is not thin and shiny. The skin to the bilateral feet is  warm, supple, and dry. Vascular: DP pulse of the right foot is  palpable. DP pulse of the left foot is  palpable. PT pulse of the right foot is  palpable. PT pulse of the left foot is  palpable. CFT is less than 3 secs to the digits of the right foot. CFT is less than 3 secs to the digits of the left foot. There is no edema noted to the bilateral foot or ankle. There is no hair growth noted to the digits of the bilateral feet. There are varicosities noted to the right foot/ankle. There are varicosities noted to the left foot/ankle. Erythema is absent to the bilateral feet. Neurological: Reflexes are present to the right plantar foot and to the Achilles tendon. Reflexes are present to the left plantar foot and to the Achilles tendon. Epicritic sensation is  intact to the right foot. Epicritic sensation is  intact to the left foot. Musculoskeletal:  Muscle strength is +5/5 to all four muscle groups of the right lower extremity and +5/5 to all four muscle groups of the left lower extremity. There are no areas of subluxation, dislocation, or laxity noted to either lower extremity. Range of motion to the right ankle is  free of pain or grinding. Range of motion to the left ankle is  free of pain or grinding. Range of motion to the right subtalar joint is  free of pain or grinding. Range of motion to the left subtalar joint is  free of pain or grinding. No abnormalities, asymmetries, or misalignments are seen between the extremities.     Weightbearing evaluation does not reveal rearfoot eversion, medial prominence of the talar head, loss of the medial longitudinal arch height, and too many toes sign bilaterally. The digits of the right foot are contracted. The digits of the left foot are contracted. There is no prominence noted to the first metatarsal head without abduction of the hallux of the right foot. There is no prominence noted to the first metatarsal head without abduction of the hallux of the left foot. Shoe examination was performed. Biomechanical Exam: normal bilaterally. Asessment: Patient is a 79 y.o. male with:    Diagnosis Orders   1. Onychomycosis of toenail  ME DEBRIDEMENT OF NAILS, 6 OR MORE   2. Corns and callosities  ME TRIM HYPERKERATOTIC SKIN LESION, ONE   3. Hammer toes of both feet     4. Pain of toes of both feet  ME DEBRIDEMENT OF NAILS, 6 OR MORE   5. Pain in right foot  ME TRIM HYPERKERATOTIC SKIN LESION, ONE       Plan:  1. Clinical evaluation of the patient. 2. Toenails 1-5 of the right foot and 1-5 of the left foot were debrided in length and thickness using a nail nipper and a . The lesion(s) to the right foot debrided with a 15 blade down to healthy, pink skin without event. 3. Contact office with any questions/problems/concerns.   Return in about 9 weeks (around 5/20/2021) for Painful fungal nails, Painful callous(es).   3/18/2021      Haley Hylton DPM 30

## 2021-07-20 ENCOUNTER — APPOINTMENT (OUTPATIENT)
Dept: SURGICAL ONCOLOGY | Facility: CLINIC | Age: 56
End: 2021-07-20
Payer: MEDICAID

## 2021-07-20 ENCOUNTER — OUTPATIENT (OUTPATIENT)
Dept: OUTPATIENT SERVICES | Facility: HOSPITAL | Age: 56
LOS: 1 days | Discharge: HOME | End: 2021-07-20

## 2021-07-20 ENCOUNTER — APPOINTMENT (OUTPATIENT)
Age: 56
End: 2021-07-20
Payer: MEDICAID

## 2021-07-20 VITALS
BODY MASS INDEX: 31.97 KG/M2 | DIASTOLIC BLOOD PRESSURE: 79 MMHG | RESPIRATION RATE: 17 BRPM | OXYGEN SATURATION: 98 % | SYSTOLIC BLOOD PRESSURE: 122 MMHG | TEMPERATURE: 97.7 F | HEART RATE: 66 BPM | HEIGHT: 62.01 IN | WEIGHT: 175.93 LBS

## 2021-07-20 VITALS
WEIGHT: 175 LBS | SYSTOLIC BLOOD PRESSURE: 128 MMHG | BODY MASS INDEX: 32.2 KG/M2 | OXYGEN SATURATION: 98 % | HEART RATE: 66 BPM | RESPIRATION RATE: 17 BRPM | HEIGHT: 62 IN | DIASTOLIC BLOOD PRESSURE: 76 MMHG

## 2021-07-20 DIAGNOSIS — Z98.890 OTHER SPECIFIED POSTPROCEDURAL STATES: Chronic | ICD-10-CM

## 2021-07-20 DIAGNOSIS — N39.3 STRESS INCONTINENCE (FEMALE) (MALE): Chronic | ICD-10-CM

## 2021-07-20 DIAGNOSIS — D05.12 INTRADUCTAL CARCINOMA IN SITU OF LEFT BREAST: ICD-10-CM

## 2021-07-20 DIAGNOSIS — D05.10 INTRADUCTAL CARCINOMA IN SITU OF UNSPECIFIED BREAST: ICD-10-CM

## 2021-07-20 DIAGNOSIS — N64.4 MASTODYNIA: ICD-10-CM

## 2021-07-20 PROCEDURE — 99213 OFFICE O/P EST LOW 20 MIN: CPT

## 2021-07-20 PROCEDURE — 99214 OFFICE O/P EST MOD 30 MIN: CPT

## 2021-07-20 NOTE — PHYSICAL EXAM
[Fully active, able to carry on all pre-disease performance without restriction] : Status 0 - Fully active, able to carry on all pre-disease performance without restriction [Normal] : normal appearance, no rash, nodules, vesicles, ulcers, erythema [de-identified] : bilateral breast augmentation; left breast scar, s/p excisional biopsy- well healed.

## 2021-07-20 NOTE — HISTORY OF PRESENT ILLNESS
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 Patel).\par \par 11/2/17: Mammogram/sonogram revealed dense breasts indeterminate cluster of calcification in the outer upper left breast for which stereotactic biopsy recommended.\par \par 11/24/17- Stereotactic biopsy revealed fibrocystic changes with proliferative features including microcyst formation, adenosis, usual ductal hyperplasia, columnar cell change, associated microcalcification and apocrine metaplasia associated with focal atypia.\par \par 10/26/18: Mammogram- left breast calcification unchanged from prior imaging; surgical excision advised. Pathology showed fibrocystic changes, ductal hyperplasia and microcalcification.\par \par 5/23/19- left breast lumpectomy; final pathology consistent with focal DCIS, intermediate grade, ER positive MS positive, cribriform with necrosis. Margins negative.\par \par 7/23/19- evaluated by RT oncology ( Dr. Houser) -DCIS on RT score 0.8 ( low) - no need for XRT.\par \par 9/16/19- started Tamoxifen\par \par 10/17- 11/19/19- discontinued Tamoxifen due to lower extremity weakness.\par \par 10/18/19- mammogram/breast ultrasound: No suspicious mass, microcalcification, or other signs of malignancy identified. Postsurgical changes in the upper outer left breast. Stable scattered benign appearing the right breast calcification; right breast intramammary lymph node. No sonographic evidence of malignancy.\par \par 10/27/2020: Bilateral breast ultrasound/mammogram–no mammogram or sonogram evidence of malignancy.  [de-identified] : DCIS [de-identified] : ER positive SD positive [FreeTextEntry1] : adjuvant hormonal therapy. [de-identified] : Short-term follow-up visit to assess her progress while on tamoxifen.  Patient's last mammogram and breast ultrasound from October 2020 showed no evidence of malignancy, and patient seems to tolerate adjusted dose of SERMs (tamoxifen 10 mg daily) without significant side effects.  She has been on adjuvant hormonal therapy since September 2019.  Physical exam unchanged; she is occasionally complaining of left chest wall discomfort.  Medication list reviewed.  Hematologic profile stable.  Patient did not receive Covid vaccination.  Patient will be leaving for [facility] for the next few months, and is in need of tamoxifen for the duration.\par \par

## 2021-07-20 NOTE — PHYSICAL EXAM
[Normal] : supple, no neck mass and thyroid not enlarged [Normal Neck Lymph Nodes] : normal neck lymph nodes  [Normal Supraclavicular Lymph Nodes] : normal supraclavicular lymph nodes [Normal Groin Lymph Nodes] : normal groin lymph nodes [Normal Axillary Lymph Nodes] : normal axillary lymph nodes [Normal] : oriented to person, place and time, with appropriate affect [FreeTextEntry1] : COVID-19 precautions as per [facility] policy was universally followed\par  [de-identified] : Complete breast exam performed in supine and upright positions. No palpable masses, tenderness, nipple discharge, inversion, deviation or enlarged axillary or supraclavicular lymph nodes bilaterally.

## 2021-07-20 NOTE — CONSULT LETTER
[Courtesy Letter:] : I had the pleasure of seeing your patient, [unfilled], in my office today. [Please see my note below.] : Please see my note below. [Consult Closing:] : Thank you very much for allowing me to participate in the care of this patient.  If you have any questions, please do not hesitate to contact me. [Sincerely,] : Sincerely, [( Thank you for referring [unfilled] for consultation for _____ )] : Thank you for referring [unfilled] for consultation for [unfilled] [Dear  ___] : Dear  [unfilled], [FreeTextEntry2] : Brandon Echevarria [FreeTextEntry3] : Mateo Crowe MD, FICS, FACS\par , Surgical Oncology \par [facility] [employer] [facility] [employer] [facility] \par [address]\[place] [address]\par \[place] [address]\par \par (mob) [phone]\par (o) [phone]\par (f) [phone]\par

## 2021-07-20 NOTE — HISTORY OF PRESENT ILLNESS
[de-identified] : Ms. Tony is a 55 year old female who presents today for complaint of left breast pain. \par \par Today 21: patient complaint of left breast pain after having a massage. Patient denies breasts lump, nipple discharge, inversion of nipple.\par \par Pertinent hx: \par DCIS and liver cyst. She completed her colonoscopy 19 revealing melanosis and hemorrhoids. Recent MMG/US bilateral breast 10/27/20 (BIRADS 2): No mammographic or sonographic evidence of malignancy. No evidence of changes of a suspicious nature since the prior images. US abdomen complete 10/27/20: Simple appearing right hepatic cyst, unchanged.\par \par 12/15/20: Today the pt was w/o any complaints. Denies any pain and denies any irregular eating habits. Denies constitutional symptoms. \par \par Samantha went for her annual breast imaging including mammo/sono in  which revealed calcifications in the upper outer Left breast. She underwent a stereotactic biopsy which revealed Focal Atypia. Samantha states that her PCP attempted to refer her to a surgeon however could not find one who accepts her insurance. She presents today for follow up of her pathology results. She is s/p bilateral breast augmentation with saline implants in  (Dr. Patel). \par \par She went for a repeat mammo/sono on 10/26/18 which once again revealed Left breast calcifications unchanged from prior imaging. Since prior biopsy found focal atypia, surgical excision is advised (BIRADS 4). Final pathology showed fibrocystic changes, usual ductal hyperplasia and microcalcifications. \par \par Previously, she is s/p left breast lumpectomy on 19 with a final path of focal DCIS intermediate grade (Er/Pr+).  She is currently taking Tamoxifen daily under the guidance of Dr. Samuels without complaint.  Deemed not necessary to undergo XRT.  She underwent a b/l mammo/sono 10/31/19 which was without evidence of malignancy (Birads 2). \par \par She underwent a MRI abdomen on 10/31/19 to evaluate her liver cyst which was noted to be stable at 7cm since May 2017.  She denies abdominal discomfort.  Today she is feeling generally well without complaint or concern.\par  \par No significant PMH.\par Denies family history of breast or ovarian cancer.\par \par Menarche: 13 y.o.\par  (Age at first pregnancy: 23)\par \par Internist: Brandon Echevarria [phone]

## 2021-07-27 ENCOUNTER — TRANSCRIPTION ENCOUNTER (OUTPATIENT)
Age: 56
End: 2021-07-27

## 2021-09-22 ENCOUNTER — APPOINTMENT (OUTPATIENT)
Dept: OBGYN | Facility: HOSPITAL | Age: 56
End: 2021-09-22

## 2021-10-14 ENCOUNTER — RESULT REVIEW (OUTPATIENT)
Age: 56
End: 2021-10-14

## 2021-11-02 ENCOUNTER — RESULT REVIEW (OUTPATIENT)
Age: 56
End: 2021-11-02

## 2021-11-02 ENCOUNTER — APPOINTMENT (OUTPATIENT)
Dept: MAMMOGRAPHY | Facility: IMAGING CENTER | Age: 56
End: 2021-11-02
Payer: MEDICAID

## 2021-11-02 ENCOUNTER — APPOINTMENT (OUTPATIENT)
Dept: ULTRASOUND IMAGING | Facility: IMAGING CENTER | Age: 56
End: 2021-11-02
Payer: MEDICAID

## 2021-11-02 ENCOUNTER — OUTPATIENT (OUTPATIENT)
Dept: OUTPATIENT SERVICES | Facility: HOSPITAL | Age: 56
LOS: 1 days | End: 2021-11-02
Payer: MEDICAID

## 2021-11-02 DIAGNOSIS — Z98.890 OTHER SPECIFIED POSTPROCEDURAL STATES: Chronic | ICD-10-CM

## 2021-11-02 DIAGNOSIS — D05.12 INTRADUCTAL CARCINOMA IN SITU OF LEFT BREAST: ICD-10-CM

## 2021-11-02 DIAGNOSIS — N39.3 STRESS INCONTINENCE (FEMALE) (MALE): Chronic | ICD-10-CM

## 2021-11-02 PROCEDURE — 76641 ULTRASOUND BREAST COMPLETE: CPT | Mod: 26,50

## 2021-11-02 PROCEDURE — 77066 DX MAMMO INCL CAD BI: CPT

## 2021-11-02 PROCEDURE — 77062 BREAST TOMOSYNTHESIS BI: CPT | Mod: 26

## 2021-11-02 PROCEDURE — 77066 DX MAMMO INCL CAD BI: CPT | Mod: 26

## 2021-11-02 PROCEDURE — 76641 ULTRASOUND BREAST COMPLETE: CPT

## 2021-11-02 PROCEDURE — G0279: CPT

## 2021-12-13 ENCOUNTER — APPOINTMENT (OUTPATIENT)
Dept: OBGYN | Facility: HOSPITAL | Age: 56
End: 2021-12-13
Payer: MEDICAID

## 2021-12-13 ENCOUNTER — RESULT REVIEW (OUTPATIENT)
Age: 56
End: 2021-12-13

## 2021-12-13 ENCOUNTER — OUTPATIENT (OUTPATIENT)
Dept: OUTPATIENT SERVICES | Facility: HOSPITAL | Age: 56
LOS: 1 days | End: 2021-12-13

## 2021-12-13 VITALS
BODY MASS INDEX: 33.65 KG/M2 | HEART RATE: 69 BPM | DIASTOLIC BLOOD PRESSURE: 83 MMHG | SYSTOLIC BLOOD PRESSURE: 126 MMHG | WEIGHT: 184 LBS | TEMPERATURE: 97.2 F

## 2021-12-13 DIAGNOSIS — Z78.9 OTHER SPECIFIED HEALTH STATUS: ICD-10-CM

## 2021-12-13 DIAGNOSIS — Z98.890 OTHER SPECIFIED POSTPROCEDURAL STATES: Chronic | ICD-10-CM

## 2021-12-13 DIAGNOSIS — N39.3 STRESS INCONTINENCE (FEMALE) (MALE): ICD-10-CM

## 2021-12-13 DIAGNOSIS — E78.5 HYPERLIPIDEMIA, UNSPECIFIED: ICD-10-CM

## 2021-12-13 DIAGNOSIS — N99.3 PROLAPSE OF VAGINAL VAULT AFTER HYSTERECTOMY: ICD-10-CM

## 2021-12-13 DIAGNOSIS — E11.9 TYPE 2 DIABETES MELLITUS W/OUT COMPLICATIONS: ICD-10-CM

## 2021-12-13 DIAGNOSIS — N39.3 STRESS INCONTINENCE (FEMALE) (MALE): Chronic | ICD-10-CM

## 2021-12-13 PROCEDURE — 99213 OFFICE O/P EST LOW 20 MIN: CPT | Mod: GC

## 2021-12-14 ENCOUNTER — APPOINTMENT (OUTPATIENT)
Dept: SURGICAL ONCOLOGY | Facility: CLINIC | Age: 56
End: 2021-12-14

## 2021-12-14 LAB — HPV HIGH+LOW RISK DNA PNL CVX: SIGNIFICANT CHANGE UP

## 2021-12-14 NOTE — PLAN
[FreeTextEntry1] : 56 year old  s/p Supracervical hysterectomy presents to clinic for annual exam. Complaints of decreased sexual interest. No other complaints today\par \par 1. Decreased sexual interest\par -Encouraged to see a sexual therapist\par -Encouraged to purchase lubrications OTC\par -No hormone replacement therapy at this time 2/ DCIS\par \par 2. HCM\par -Pap smear/HPV co-testing done 2021\par -Mammogram: 2021->next due for mammogram 2022\par \par Discussed w Dr. Guevara\par \par Marissa Mata, PGY-1\par -Colonscopy ->Next colonscopy

## 2021-12-14 NOTE — HISTORY OF PRESENT ILLNESS
[FreeTextEntry1] : 56 year old  LMP  presents to clinic for annual exam. Patient complaints that she has lost interest in sex with her . Denies any dyspareunia. Other than this patient has no complaints today. Denies dysuria, vaginal discharge, or pelvic pain. States that she had a supracervical hysterectomy in  2/2 to fibroids. Denies any abnormal vaginal bleeding since the hysterectomy.\par \par OB:  x4, TOP x2\par Gyn: +Fibroids, +abnormal paps in pregnancy, -cysts, -STIs\par \par PMHx: T2DM, HLD\par Meds: Metformin 1000mg, Simvastatin 40mg\par All: None\par Surgeries: Abdominplasty, MARIYA, Hernia Repair, Breast Augmentation\par Social: Denies toxic habits x3\par \par HCM\par -Mammogram/Breast US: 2021-> No malignancy, f/u in \par -Colonoscopy -> No malignancy, f/u in \par -Pap: 2019: No intraepithelial malignancy, HPV+\par

## 2021-12-14 NOTE — PHYSICAL EXAM
[Appropriately responsive] : appropriately responsive [No Acute Distress] : no acute distress [Regular Rate Rhythm] : regular rate rhythm [No Murmurs] : no murmurs [Clear to Auscultation B/L] : clear to auscultation bilaterally [Soft] : soft [Non-tender] : non-tender [Non-distended] : non-distended [Oriented x3] : oriented x3 [___] : a [unfilled] ~Ucm mastectomy scar [No Masses] : no breast masses were palpable [Labia Majora] : normal [Labia Minora] : normal [Normal] : normal [Absent] : absent [FreeTextEntry8] : no tenderness

## 2021-12-16 LAB — CYTOLOGY SPEC DOC CYTO: SIGNIFICANT CHANGE UP

## 2022-01-12 NOTE — BRIEF OPERATIVE NOTE - NSICDXBRIEFPREOP_GEN_ALL_CORE_FT
PRE-OP DIAGNOSIS:  Left breast mass 23-May-2019 10:09:52  Anju Lawson rizwana all pertinent systems normal

## 2022-03-24 ENCOUNTER — OUTPATIENT (OUTPATIENT)
Dept: OUTPATIENT SERVICES | Facility: HOSPITAL | Age: 57
LOS: 1 days | Discharge: HOME | End: 2022-03-24

## 2022-03-24 DIAGNOSIS — D05.12 INTRADUCTAL CARCINOMA IN SITU OF LEFT BREAST: ICD-10-CM

## 2022-03-24 DIAGNOSIS — Z98.890 OTHER SPECIFIED POSTPROCEDURAL STATES: Chronic | ICD-10-CM

## 2022-03-24 DIAGNOSIS — N39.3 STRESS INCONTINENCE (FEMALE) (MALE): Chronic | ICD-10-CM

## 2022-03-28 ENCOUNTER — APPOINTMENT (OUTPATIENT)
Age: 57
End: 2022-03-28

## 2022-04-01 ENCOUNTER — RESULT REVIEW (OUTPATIENT)
Age: 57
End: 2022-04-01

## 2022-04-01 ENCOUNTER — APPOINTMENT (OUTPATIENT)
Dept: HEMATOLOGY ONCOLOGY | Facility: CLINIC | Age: 57
End: 2022-04-01
Payer: MEDICAID

## 2022-04-01 VITALS
BODY MASS INDEX: 34.44 KG/M2 | DIASTOLIC BLOOD PRESSURE: 72 MMHG | WEIGHT: 188.25 LBS | TEMPERATURE: 97.2 F | OXYGEN SATURATION: 98 % | SYSTOLIC BLOOD PRESSURE: 115 MMHG | RESPIRATION RATE: 16 BRPM | HEART RATE: 86 BPM

## 2022-04-01 LAB
BASOPHILS # BLD AUTO: 0.02 K/UL — SIGNIFICANT CHANGE UP (ref 0–0.2)
BASOPHILS NFR BLD AUTO: 0.3 % — SIGNIFICANT CHANGE UP (ref 0–2)
EOSINOPHIL # BLD AUTO: 0.18 K/UL — SIGNIFICANT CHANGE UP (ref 0–0.5)
EOSINOPHIL NFR BLD AUTO: 2.6 % — SIGNIFICANT CHANGE UP (ref 0–6)
HCT VFR BLD CALC: 41.8 % — SIGNIFICANT CHANGE UP (ref 34.5–45)
HGB BLD-MCNC: 13.3 G/DL — SIGNIFICANT CHANGE UP (ref 11.5–15.5)
IMM GRANULOCYTES NFR BLD AUTO: 0.3 % — SIGNIFICANT CHANGE UP (ref 0–1.5)
LYMPHOCYTES # BLD AUTO: 3.41 K/UL — HIGH (ref 1–3.3)
LYMPHOCYTES # BLD AUTO: 49.3 % — HIGH (ref 13–44)
MCHC RBC-ENTMCNC: 27.9 PG — SIGNIFICANT CHANGE UP (ref 27–34)
MCHC RBC-ENTMCNC: 31.8 G/DL — LOW (ref 32–36)
MCV RBC AUTO: 87.8 FL — SIGNIFICANT CHANGE UP (ref 80–100)
MONOCYTES # BLD AUTO: 0.54 K/UL — SIGNIFICANT CHANGE UP (ref 0–0.9)
MONOCYTES NFR BLD AUTO: 7.8 % — SIGNIFICANT CHANGE UP (ref 2–14)
NEUTROPHILS # BLD AUTO: 2.74 K/UL — SIGNIFICANT CHANGE UP (ref 1.8–7.4)
NEUTROPHILS NFR BLD AUTO: 39.7 % — LOW (ref 43–77)
NRBC # BLD: 0 /100 WBCS — SIGNIFICANT CHANGE UP (ref 0–0)
PLATELET # BLD AUTO: 204 K/UL — SIGNIFICANT CHANGE UP (ref 150–400)
RBC # BLD: 4.76 M/UL — SIGNIFICANT CHANGE UP (ref 3.8–5.2)
RBC # FLD: 14.9 % — HIGH (ref 10.3–14.5)
WBC # BLD: 6.91 K/UL — SIGNIFICANT CHANGE UP (ref 3.8–10.5)
WBC # FLD AUTO: 6.91 K/UL — SIGNIFICANT CHANGE UP (ref 3.8–10.5)

## 2022-04-01 PROCEDURE — 99214 OFFICE O/P EST MOD 30 MIN: CPT

## 2022-04-01 RX ORDER — ASPIRIN 81 MG/1
81 TABLET, COATED ORAL
Qty: 30 | Refills: 0 | Status: ACTIVE | COMMUNITY
Start: 2021-07-26

## 2022-04-01 RX ORDER — AMMONIUM LACTATE 12 %
12 CREAM (GRAM) TOPICAL
Qty: 385 | Refills: 0 | Status: ACTIVE | COMMUNITY
Start: 2022-01-02

## 2022-04-01 RX ORDER — LISINOPRIL 5 MG/1
5 TABLET ORAL
Qty: 30 | Refills: 0 | Status: ACTIVE | COMMUNITY
Start: 2021-10-09

## 2022-04-01 RX ORDER — SEMAGLUTIDE 1.34 MG/ML
4 INJECTION, SOLUTION SUBCUTANEOUS
Qty: 3 | Refills: 0 | Status: ACTIVE | COMMUNITY
Start: 2021-11-23

## 2022-04-01 RX ORDER — SIMVASTATIN 40 MG/1
40 TABLET, FILM COATED ORAL
Qty: 30 | Refills: 0 | Status: ACTIVE | COMMUNITY
Start: 2021-10-09

## 2022-04-03 NOTE — HISTORY OF PRESENT ILLNESS
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 Patel).\par \par 11/2/17: Mammogram/sonogram revealed dense breasts indeterminate cluster of calcification in the outer upper left breast for which stereotactic biopsy recommended.\par \par 11/24/17- Stereotactic biopsy revealed fibrocystic changes with proliferative features including microcyst formation, adenosis, usual ductal hyperplasia, columnar cell change, associated microcalcification and apocrine metaplasia associated with focal atypia.\par \par 10/26/18: Mammogram- left breast calcification unchanged from prior imaging; surgical excision advised. Pathology showed fibrocystic changes, ductal hyperplasia and microcalcification.\par \par 5/23/19- left breast lumpectomy; final pathology consistent with focal DCIS, intermediate grade, ER positive HI positive, cribriform with necrosis. Margins negative.\par \par 7/23/19- evaluated by RT oncology ( Dr. Houser) -DCIS on RT score 0.8 ( low) - no need for XRT.\par \par 9/16/19- started Tamoxifen\par \par 10/17- 11/19/19- discontinued Tamoxifen due to lower extremity weakness.\par \par 10/18/19- mammogram/breast ultrasound: No suspicious mass, microcalcification, or other signs of malignancy identified. Postsurgical changes in the upper outer left breast. Stable scattered benign appearing the right breast calcification; right breast intramammary lymph node. No sonographic evidence of malignancy.\par \par 10/27/2020: Bilateral breast ultrasound/mammogram–no mammogram or sonogram evidence of malignancy. \par \par July 2021: interrupts tamoxifen (concerned over side effects)\par \par 11/2/2021 bilateral mammogram/breast ultrasound :no mammographic or sonographic evidence of malignancy [de-identified] : DCIS [de-identified] : ER positive NH positive [de-identified] : Returning for biannual oncologic follow-up for breast cancer.  She was last seen in July 2021.  Ever since she discontinued tamoxifen.  Reports occasional  sharp pain around left breast scar, short lasting, at times intense, not associated with skin changes.  She traveled to native [place] for [employer].  States she feels fine, continues to work.  Compliant with other medications; has plans to resume tamoxifen.  Appears nontoxic.  Appetite and weight preserved.  Last mammogram/breast ultrasound from November 2, 2021 showed no mammographic or sonographic evidence of malignancy.  Patient is still inquiring whether she is a candidate for estrogen supplementation.\par \par \par \par \par  [FreeTextEntry1] : adjuvant hormonal therapy.

## 2022-04-03 NOTE — PHYSICAL EXAM
[Fully active, able to carry on all pre-disease performance without restriction] : Status 0 - Fully active, able to carry on all pre-disease performance without restriction [Normal] : normal appearance, no rash, nodules, vesicles, ulcers, erythema [de-identified] : bilateral breast augmentation; left breast scar, s/p excisional biopsy- well healed.

## 2022-04-04 ENCOUNTER — NON-APPOINTMENT (OUTPATIENT)
Age: 57
End: 2022-04-04

## 2022-04-04 LAB
25(OH)D3 SERPL-MCNC: 42.2 NG/ML
ALBUMIN SERPL ELPH-MCNC: 4.5 G/DL
ALP BLD-CCNC: 67 U/L
ALT SERPL-CCNC: 12 U/L
ANION GAP SERPL CALC-SCNC: 19 MMOL/L
AST SERPL-CCNC: 18 U/L
BILIRUB SERPL-MCNC: 0.2 MG/DL
BUN SERPL-MCNC: 13 MG/DL
CALCIUM SERPL-MCNC: 9.9 MG/DL
CANCER AG27-29 SERPL-ACNC: 10.2 U/ML
CHLORIDE SERPL-SCNC: 105 MMOL/L
CO2 SERPL-SCNC: 20 MMOL/L
CREAT SERPL-MCNC: 0.82 MG/DL
EGFR: 83 ML/MIN/1.73M2
GLUCOSE SERPL-MCNC: 140 MG/DL
POTASSIUM SERPL-SCNC: 5.2 MMOL/L
PROT SERPL-MCNC: 7.4 G/DL
SODIUM SERPL-SCNC: 144 MMOL/L

## 2022-06-28 ENCOUNTER — APPOINTMENT (OUTPATIENT)
Dept: SURGICAL ONCOLOGY | Facility: CLINIC | Age: 57
End: 2022-06-28

## 2022-06-28 ENCOUNTER — OUTPATIENT (OUTPATIENT)
Dept: OUTPATIENT SERVICES | Facility: HOSPITAL | Age: 57
LOS: 1 days | Discharge: HOME | End: 2022-06-28

## 2022-06-28 VITALS
TEMPERATURE: 98.5 F | BODY MASS INDEX: 32.2 KG/M2 | SYSTOLIC BLOOD PRESSURE: 108 MMHG | HEIGHT: 62 IN | RESPIRATION RATE: 15 BRPM | DIASTOLIC BLOOD PRESSURE: 69 MMHG | HEART RATE: 90 BPM | WEIGHT: 175 LBS | OXYGEN SATURATION: 94 %

## 2022-06-28 DIAGNOSIS — N39.3 STRESS INCONTINENCE (FEMALE) (MALE): Chronic | ICD-10-CM

## 2022-06-28 DIAGNOSIS — Z98.890 OTHER SPECIFIED POSTPROCEDURAL STATES: Chronic | ICD-10-CM

## 2022-06-28 DIAGNOSIS — D05.12 INTRADUCTAL CARCINOMA IN SITU OF LEFT BREAST: ICD-10-CM

## 2022-06-28 PROCEDURE — 99213 OFFICE O/P EST LOW 20 MIN: CPT

## 2022-06-30 NOTE — HISTORY OF PRESENT ILLNESS
[de-identified] : Ms. Tony is a 55 year old female who presents today for breast exam \par \par B/L mammo/sono 21: No evidence of malignancy. BIRADS 2\par \par Today 22: Pt denies any palpable lumps noted in the breast, axillae, and neck bilaterally. \par \par \par Pertinent hx: \par DCIS and liver cyst. She completed her colonoscopy 19 revealing melanosis and hemorrhoids. Recent MMG/US bilateral breast 10/27/20 (BIRADS 2): No mammographic or sonographic evidence of malignancy. No evidence of changes of a suspicious nature since the prior images. US abdomen complete 10/27/20: Simple appearing right hepatic cyst, unchanged.\par \par \par Today 21: patient complaint of left breast pain after having a massage. Patient denies breasts lump, nipple discharge, inversion of nipple.\par \par 12/15/20: Today the pt was w/o any complaints. Denies any pain and denies any irregular eating habits. Denies constitutional symptoms. \par \par Samantha went for her annual breast imaging including mammo/sono in 2017 which revealed calcifications in the upper outer Left breast. She underwent a stereotactic biopsy which revealed Focal Atypia. Samantha states that her PCP attempted to refer her to a surgeon however could not find one who accepts her insurance. She presents today for follow up of her pathology results. She is s/p bilateral breast augmentation with saline implants in  (Dr. Patel). \par \par She went for a repeat mammo/sono on 10/26/18 which once again revealed Left breast calcifications unchanged from prior imaging. Since prior biopsy found focal atypia, surgical excision is advised (BIRADS 4). Final pathology showed fibrocystic changes, usual ductal hyperplasia and microcalcifications. \par \par Previously, she is s/p left breast lumpectomy on 19 with a final path of focal DCIS intermediate grade (Er/Pr+).  She is currently taking Tamoxifen daily under the guidance of Dr. Samuels without complaint.  Deemed not necessary to undergo XRT.  She underwent a b/l mammo/sono 10/31/19 which was without evidence of malignancy (Birads 2). \par \par She underwent a MRI abdomen on 10/31/19 to evaluate her liver cyst which was noted to be stable at 7cm since May 2017.  She denies abdominal discomfort.  Today she is feeling generally well without complaint or concern.\par  \par No significant PMH.\par Denies family history of breast or ovarian cancer.\par \par Menarche: 13 y.o.\par  (Age at first pregnancy: 23)\par \par Internist: Brandon Echevarria [phone]

## 2022-06-30 NOTE — PHYSICAL EXAM
[Normal] : supple, no neck mass and thyroid not enlarged [Normal Neck Lymph Nodes] : normal neck lymph nodes  [Normal Supraclavicular Lymph Nodes] : normal supraclavicular lymph nodes [Normal Groin Lymph Nodes] : normal groin lymph nodes [Normal Axillary Lymph Nodes] : normal axillary lymph nodes [Normal] : oriented to person, place and time, with appropriate affect [FreeTextEntry1] : COVID-19 precautions as per [facility] policy was universally followed\par  [de-identified] : B/L breast implants. Complete breast exam performed in supine and upright positions. No palpable masses, tenderness, nipple discharge, inversion, deviation or enlarged axillary or supraclavicular lymph nodes bilaterally.

## 2022-06-30 NOTE — CONSULT LETTER
[FreeTextEntry3] : Mateo Crowe MD, FICS, FACS\par , Surgical Oncology \par [facility] \par [address]\[place] [address]\[place] \[place] [address]\par \par (mob) [phone]\par (o) [phone]\par (f) [phone]\par

## 2022-07-01 ENCOUNTER — APPOINTMENT (OUTPATIENT)
Age: 57
End: 2022-07-01

## 2022-07-01 VITALS
DIASTOLIC BLOOD PRESSURE: 70 MMHG | TEMPERATURE: 97.9 F | HEART RATE: 81 BPM | SYSTOLIC BLOOD PRESSURE: 112 MMHG | RESPIRATION RATE: 16 BRPM | OXYGEN SATURATION: 98 %

## 2022-07-01 VITALS — WEIGHT: 181 LBS | BODY MASS INDEX: 33.11 KG/M2

## 2022-07-01 PROCEDURE — 99213 OFFICE O/P EST LOW 20 MIN: CPT

## 2022-07-02 NOTE — PHYSICAL EXAM
[Fully active, able to carry on all pre-disease performance without restriction] : Status 0 - Fully active, able to carry on all pre-disease performance without restriction [Normal] : normal appearance, no rash, nodules, vesicles, ulcers, erythema [de-identified] : bilateral breast augmentation; left breast scar, s/p excisional biopsy- well healed.

## 2022-07-02 NOTE — HISTORY OF PRESENT ILLNESS
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 Patel).\par \par 11/2/17: Mammogram/sonogram revealed dense breasts indeterminate cluster of calcification in the outer upper left breast for which stereotactic biopsy recommended.\par \par 11/24/17- Stereotactic biopsy revealed fibrocystic changes with proliferative features including microcyst formation, adenosis, usual ductal hyperplasia, columnar cell change, associated microcalcification and apocrine metaplasia associated with focal atypia.\par \par 10/26/18: Mammogram- left breast calcification unchanged from prior imaging; surgical excision advised. Pathology showed fibrocystic changes, ductal hyperplasia and microcalcification.\par \par 5/23/19- left breast lumpectomy; final pathology consistent with focal DCIS, intermediate grade, ER positive NM positive, cribriform with necrosis. Margins negative.\par \par 7/23/19- evaluated by RT oncology ( Dr. Houser) -DCIS on RT score 0.8 ( low) - no need for XRT.\par \par 9/16/19- started Tamoxifen\par \par 10/17- 11/19/19- discontinued Tamoxifen due to lower extremity weakness.\par \par 10/18/19- mammogram/breast ultrasound: No suspicious mass, microcalcification, or other signs of malignancy identified. Postsurgical changes in the upper outer left breast. Stable scattered benign appearing the right breast calcification; right breast intramammary lymph node. No sonographic evidence of malignancy.\par \par 10/27/2020: Bilateral breast ultrasound/mammogram–no mammogram or sonogram evidence of malignancy. \par \par July 2021: interrupts tamoxifen (concerned over side effects)\par \par 11/2/2021 bilateral mammogram/breast ultrasound :no mammographic or sonographic evidence of malignancy [de-identified] : DCIS [de-identified] : ER positive SC positive [FreeTextEntry1] : adjuvant hormonal therapy. [de-identified] : Short interval visit as patient needs clearance for upcoming elective liposuction scheduled for this month at [facility] in [place], Dr. Charlie Hinkle.  Patient decided on her own to discontinue tamoxifen 1 week ago, as she noticed lower extremity swelling.  Since the start of endocrine therapy patient has been discontinued tamoxifen anytime she experienced side effects, traveled abroad, etc, without notifying our office.  She was last seen in the office in April 2022, when she had an extensive oncologic examination.  Clinically unchanged in the past 2 months.\par \par \par

## 2022-09-28 ENCOUNTER — OUTPATIENT (OUTPATIENT)
Dept: OUTPATIENT SERVICES | Facility: HOSPITAL | Age: 57
LOS: 1 days | Discharge: HOME | End: 2022-09-28

## 2022-09-28 DIAGNOSIS — Z98.890 OTHER SPECIFIED POSTPROCEDURAL STATES: Chronic | ICD-10-CM

## 2022-09-28 DIAGNOSIS — N39.3 STRESS INCONTINENCE (FEMALE) (MALE): Chronic | ICD-10-CM

## 2022-09-28 DIAGNOSIS — D05.12 INTRADUCTAL CARCINOMA IN SITU OF LEFT BREAST: ICD-10-CM

## 2022-10-06 ENCOUNTER — APPOINTMENT (OUTPATIENT)
Age: 57
End: 2022-10-06

## 2022-10-06 VITALS
HEART RATE: 72 BPM | OXYGEN SATURATION: 100 % | SYSTOLIC BLOOD PRESSURE: 125 MMHG | TEMPERATURE: 97.3 F | HEIGHT: 62.01 IN | DIASTOLIC BLOOD PRESSURE: 80 MMHG | RESPIRATION RATE: 16 BRPM | WEIGHT: 186.07 LBS | BODY MASS INDEX: 33.81 KG/M2

## 2022-10-06 PROCEDURE — 99214 OFFICE O/P EST MOD 30 MIN: CPT

## 2022-10-06 NOTE — PHYSICAL EXAM
[Fully active, able to carry on all pre-disease performance without restriction] : Status 0 - Fully active, able to carry on all pre-disease performance without restriction [Normal] : normal appearance, no rash, nodules, vesicles, ulcers, erythema [Obese] : obese [de-identified] : bilateral breast augmentation; left breast scar, s/p excisional biopsy- well healed.

## 2022-10-06 NOTE — HISTORY OF PRESENT ILLNESS
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 Patel).\par \par 11/2/17: Mammogram/sonogram revealed dense breasts indeterminate cluster of calcification in the outer upper left breast for which stereotactic biopsy recommended.\par \par 11/24/17- Stereotactic biopsy revealed fibrocystic changes with proliferative features including microcyst formation, adenosis, usual ductal hyperplasia, columnar cell change, associated microcalcification and apocrine metaplasia associated with focal atypia.\par \par 10/26/18: Mammogram- left breast calcification unchanged from prior imaging; surgical excision advised. Pathology showed fibrocystic changes, ductal hyperplasia and microcalcification.\par \par 5/23/19- left breast lumpectomy; final pathology consistent with focal DCIS, intermediate grade, ER positive SC positive, cribriform with necrosis. Margins negative.\par \par 7/23/19- evaluated by RT oncology ( Dr. Houser) -DCIS on RT score 0.8 ( low) - no need for XRT.\par \par 9/16/19- started Tamoxifen\par \par 10/17- 11/19/19- discontinued Tamoxifen due to lower extremity weakness.\par \par 10/18/19- mammogram/breast ultrasound: No suspicious mass, microcalcification, or other signs of malignancy identified. Postsurgical changes in the upper outer left breast. Stable scattered benign appearing the right breast calcification; right breast intramammary lymph node. No sonographic evidence of malignancy.\par \par 10/27/2020: Bilateral breast ultrasound/mammogram–no mammogram or sonogram evidence of malignancy. \par \par July 2021: interrupts tamoxifen (concerned over side effects)\par \par 11/2/2021 bilateral mammogram/breast ultrasound :no mammographic or sonographic evidence of malignancy [de-identified] : DCIS [de-identified] : ER positive ME positive [FreeTextEntry1] : adjuvant hormonal therapy. [de-identified] : Last seen in July 2022 for hematologic clearance in anticipation of 360 liposuction; in interim procedure was postponed due to patient's diagnosis of DCIS.  Procedure is rescheduled for January 26, 2023.  She is complaining of lower extremity weakness, however her neurologic examination is negative.\par Started physical therapy [facility] in ambulatory since last week.  Denies other constitutional symptoms.  Physical examination unchanged.  Denies new medications; compliant with tamoxifen; reports no vaginal discharge, no venous thrombotic events.  Hematologic profile normal.\par \par

## 2022-11-15 ENCOUNTER — APPOINTMENT (OUTPATIENT)
Dept: ULTRASOUND IMAGING | Facility: IMAGING CENTER | Age: 57
End: 2022-11-15

## 2022-11-15 ENCOUNTER — APPOINTMENT (OUTPATIENT)
Dept: MAMMOGRAPHY | Facility: IMAGING CENTER | Age: 57
End: 2022-11-15

## 2022-11-15 ENCOUNTER — OUTPATIENT (OUTPATIENT)
Dept: OUTPATIENT SERVICES | Facility: HOSPITAL | Age: 57
LOS: 1 days | End: 2022-11-15
Payer: MEDICAID

## 2022-11-15 ENCOUNTER — RESULT REVIEW (OUTPATIENT)
Age: 57
End: 2022-11-15

## 2022-11-15 DIAGNOSIS — Z98.890 OTHER SPECIFIED POSTPROCEDURAL STATES: Chronic | ICD-10-CM

## 2022-11-15 DIAGNOSIS — D05.12 INTRADUCTAL CARCINOMA IN SITU OF LEFT BREAST: ICD-10-CM

## 2022-11-15 DIAGNOSIS — N39.3 STRESS INCONTINENCE (FEMALE) (MALE): Chronic | ICD-10-CM

## 2022-11-15 PROCEDURE — 77062 BREAST TOMOSYNTHESIS BI: CPT | Mod: 26

## 2022-11-15 PROCEDURE — 77066 DX MAMMO INCL CAD BI: CPT

## 2022-11-15 PROCEDURE — 76641 ULTRASOUND BREAST COMPLETE: CPT | Mod: 26,50

## 2022-11-15 PROCEDURE — 77066 DX MAMMO INCL CAD BI: CPT | Mod: 26

## 2022-11-15 PROCEDURE — 76641 ULTRASOUND BREAST COMPLETE: CPT

## 2022-11-15 PROCEDURE — G0279: CPT

## 2022-12-13 ENCOUNTER — RESULT REVIEW (OUTPATIENT)
Age: 57
End: 2022-12-13

## 2022-12-13 ENCOUNTER — APPOINTMENT (OUTPATIENT)
Dept: OBGYN | Facility: HOSPITAL | Age: 57
End: 2022-12-13

## 2022-12-13 ENCOUNTER — OUTPATIENT (OUTPATIENT)
Dept: OUTPATIENT SERVICES | Facility: HOSPITAL | Age: 57
LOS: 1 days | End: 2022-12-13

## 2022-12-13 VITALS
HEART RATE: 92 BPM | BODY MASS INDEX: 33.86 KG/M2 | TEMPERATURE: 97.6 F | SYSTOLIC BLOOD PRESSURE: 140 MMHG | WEIGHT: 184 LBS | HEIGHT: 62 IN | DIASTOLIC BLOOD PRESSURE: 83 MMHG

## 2022-12-13 DIAGNOSIS — Z98.890 OTHER SPECIFIED POSTPROCEDURAL STATES: Chronic | ICD-10-CM

## 2022-12-13 DIAGNOSIS — Z00.00 ENCOUNTER FOR GENERAL ADULT MEDICAL EXAMINATION W/OUT ABNORMAL FINDINGS: ICD-10-CM

## 2022-12-13 DIAGNOSIS — N95.2 POSTMENOPAUSAL ATROPHIC VAGINITIS: ICD-10-CM

## 2022-12-13 DIAGNOSIS — R35.0 FREQUENCY OF MICTURITION: ICD-10-CM

## 2022-12-13 DIAGNOSIS — N39.3 STRESS INCONTINENCE (FEMALE) (MALE): Chronic | ICD-10-CM

## 2022-12-13 DIAGNOSIS — R68.82 DECREASED LIBIDO: ICD-10-CM

## 2022-12-13 LAB — HIV 1+2 AB+HIV1 P24 AG SERPL QL IA: SIGNIFICANT CHANGE UP

## 2022-12-13 PROCEDURE — 99213 OFFICE O/P EST LOW 20 MIN: CPT | Mod: GC

## 2022-12-13 NOTE — HISTORY OF PRESENT ILLNESS
[FreeTextEntry1] : 57 year old  LMP  presents to clinic for annual exam. Patient complaints that she has lost interest in sex with her . She is concerned that her partner is going to leave her or cheat on her. Denies any dyspareunia. Other than this patient has no complaints today. Denies dysuria, vaginal discharge, or pelvic pain. States that she had a supracervical hysterectomy in  2/ to fibroids. Denies any abnormal vaginal bleeding since the hysterectomy.\par \par OB:  x4, TOP x2\par Gyn: +Fibroids, +abnormal paps in pregnancy, -cysts, -STIs\par \par PMHx: DCIS Stage 0 s/p Lumpectomy , T2DM, HLD\par Meds: Tamoxifen since 2019, Lisinopril, Metformin 1000mg, Simvastatin 40mg\par All: None\par PSH: Abdominoplasty, MARIYA, Hernia Repair, Breast Augmentation\par Social: Denies toxic habits x3\par \par HCM\par -Mammogram/Breast US: 10/2022 BIRAD 2\par -Colonoscopy -> No malignancy, f/u in \par -Pap: 2019: No intraepithelial malignancy, HPV+\par -Pap 2021: NIELM, HPV neg \par

## 2022-12-13 NOTE — PHYSICAL EXAM
[Chaperone Present] : A chaperone was present in the examining room during all aspects of the physical examination [Appropriately responsive] : appropriately responsive [Alert] : alert [No Acute Distress] : no acute distress [Soft] : soft [Non-tender] : non-tender [Non-distended] : non-distended [No HSM] : No HSM [No Lesions] : no lesions [No Mass] : no mass [Oriented x3] : oriented x3 [Examination Of The Breasts] : a normal appearance [] : implants [No Masses] : no breast masses were palpable [Labia Majora] : normal [Labia Minora] : normal [Normal] : normal [Absent] : absent [FreeTextEntry8] : Cervix present and uterus surgically absent

## 2022-12-13 NOTE — PLAN
[FreeTextEntry1] : 57 year old  s/p Supracervical hysterectomy presents to clinic for annual exam. Complaints of decreased sexual interest. No other complaints today\par \par 1. Decreased sexual interest\par -Encouraged to see a sexual therapist, provided contact information for Dr. Wang [phone] (sex therapist) and if unable then to contact Adeline for further care and treatment\par -Encouraged to purchase lubrications OTC\par -No hormone replacement therapy at this time 2/2 DCIS\par \par 2. HCM\par -Pap smear/HPV co-testing done 2021\par -Mammogram: 2022 BIRAD 2\par \par d/w Dr. Banks \par Nancy Turk PGY3

## 2022-12-13 NOTE — REVIEW OF SYSTEMS
[Negative] : Heme/Lymph [Fever] : no fever [Chills] : no chills [Dyspnea] : no dyspnea [Cough] : no cough [Chest Pain] : no chest pain [Palpitations] : no palpitations [Abdominal Pain] : no abdominal pain [Constipation] : no constipation [Urgency] : no urgency [Urethral Discharge] : no urethral discharge [Breast Pain] : no breast pain [Breast Lump] : no breast lump [Headache] : no headache [de-identified] : Decreased sedxual desire

## 2022-12-14 DIAGNOSIS — Z00.00 ENCOUNTER FOR GENERAL ADULT MEDICAL EXAMINATION WITHOUT ABNORMAL FINDINGS: ICD-10-CM

## 2022-12-14 DIAGNOSIS — R68.82 DECREASED LIBIDO: ICD-10-CM

## 2022-12-14 DIAGNOSIS — N95.2 POSTMENOPAUSAL ATROPHIC VAGINITIS: ICD-10-CM

## 2022-12-14 DIAGNOSIS — R35.0 FREQUENCY OF MICTURITION: ICD-10-CM

## 2022-12-14 LAB
C TRACH RRNA SPEC QL NAA+PROBE: SIGNIFICANT CHANGE UP
HBV SURFACE AG SER-ACNC: SIGNIFICANT CHANGE UP
HCV RNA FLD QL NAA+PROBE: SIGNIFICANT CHANGE UP
HCV RNA SPEC QL PROBE+SIG AMP: SIGNIFICANT CHANGE UP
N GONORRHOEA RRNA SPEC QL NAA+PROBE: SIGNIFICANT CHANGE UP
SPECIMEN SOURCE: SIGNIFICANT CHANGE UP
T PALLIDUM AB TITR SER: NEGATIVE — SIGNIFICANT CHANGE UP

## 2023-04-17 ENCOUNTER — RX RENEWAL (OUTPATIENT)
Age: 58
End: 2023-04-17

## 2023-04-25 ENCOUNTER — OUTPATIENT (OUTPATIENT)
Dept: OUTPATIENT SERVICES | Facility: HOSPITAL | Age: 58
LOS: 1 days | Discharge: HOME | End: 2023-04-25

## 2023-04-25 DIAGNOSIS — Z98.890 OTHER SPECIFIED POSTPROCEDURAL STATES: Chronic | ICD-10-CM

## 2023-04-25 DIAGNOSIS — D05.12 INTRADUCTAL CARCINOMA IN SITU OF LEFT BREAST: ICD-10-CM

## 2023-04-25 DIAGNOSIS — N39.3 STRESS INCONTINENCE (FEMALE) (MALE): Chronic | ICD-10-CM

## 2023-05-01 ENCOUNTER — APPOINTMENT (OUTPATIENT)
Age: 58
End: 2023-05-01

## 2023-07-20 NOTE — ED BEHAVIORAL HEALTH ASSESSMENT NOTE - NS ED [facility] [address]RE[address] DRUGS MEDICATION
[facility] MEDICINE PROGRESS NOTE   Patient: Ilir Blanchard  Today's Date: 7/20/2023    YOB: 1953  Admission Date: 7/18/2023    MRN: [id number]  Inpatient LOS: 2    Room: [address]  Hospital Day: Hospital Day: 3    Subjective   HISTORY AND SUBJECTIVE COMPLAINTS       Subjective:  Patient is awake, slightly confused.  Denies any pain.  Feels weak.  Hemoglobin low    Objective   PHYSICAL EXAMINATION     Vital 24 Hour Range Most Recent Value   Temperature Temp  Min: 98.4 °F (36.9 °C)  Max: 99.3 °F (37.4 °C) 98.4 °F (36.9 °C)   Pulse Pulse  Min: 67  Max: 70 68   Respiratory Resp  Min: 16  Max: 16 16   Blood Pressure BP  Min: 138/54  Max: 146/54 138/54   Pulse Oximetry SpO2  Min: 95 %  Max: 97 % 95 %   Arterial BP No data recorded     O2 No data recorded       Recorded Intake and Output:    Intake/Output Summary (Last 24 hours) at 7/20/2023 1203  Last data filed at 7/19/2023 2200  Gross per 24 hour   Intake 960 ml   Output --   Net 960 ml      Recorded Last Stool Occurrence: 1 (07/18/23 1900)     Vital Most Recent Value First Value   Weight       Height 5' 10\" (177.8 cm) Height: 5' 10\" (177.8 cm)   BMI   N/A     Skin dry   Neck supple  Lung scattered crackles with adequate air flow  CVS S 1 and S 2  Abdomen bowel sound is present  CNS cranial nerves are intact   Motor and sensory left hemiparesis .  PD line present.       TEST RESULTS     Labs: The Laboratory values listed below have been reviewed and pertinent findings discussed in the Assessment and Plan.    Laboratory values:   Recent Labs   Lab 07/20/23  0528 07/19/23  0642 07/17/23  0931   WBC 11.0 11.2* 12.2*   HGB 6.8* 7.0* 7.0*   HCT 21.3* 21.6* 21.7*    202 162       Recent Labs   Lab 07/19/23  0642 07/18/23  0542 07/16/23  1040   SODIUM 133* 135 131*   POTASSIUM 4.6 4.5 4.4   CHLORIDE 98 98 98   CO2 23 24 21   CALCIUM 8.7 9.0 8.2*   GLUCOSE 142* 138* 131*   BUN 74* 72* 71*   CREATININE 7.91* 8.02* 7.67*           Radiology: Imaging studies have been reviewed and pertinent findings discussed in the Assessment and Plan.  No results found for any visits on 07/18/23 (from the past 48 hour(s)).     ANCILLARY ORDERS     Diet:  Nursing to Pass Tray - Feed Patient  Renal (2400mg Na+, 60meq K+, 1000mg P), Consistent Carb Moderate (45-75 Gm/Meal); No Beef Diet  Telemetry:    Consults:    IP CONSULT TO SOCIAL WORK  IP CONSULT TO REHAB PSYCHOLOGY  IP CONSULT TO DIABETES EDUCATOR  IP CONSULT TO INTERNAL MEDICINE  IP CONSULT TO ENDOCRINOLOGY  IP CONSULT TO NEPHROLOGY  Therapy Orders:   PT and OT Orders Placed this Encounter   Procedures   • Occupational Therapy   • Physical Therapy       ADVANCED DIRECTIVES     Code Status: Full Resuscitation           ASSESSMENT AND PLAN     R41.82 Altered Mental Status  E16.2 Hypoglycemia.  N39.0 Urinary tract infection, site not specified  A41.9 Sepsis, unspecified organism  K85.9 Acute pancreatitis, unspecified  D64.9 Anemia, unspecified  N18.6 End stage renal disease      Plan:  Hemoglobin low at 6.8.  Will transfuse 1 unit PRBCs.  Transfusion consent obtained.  No signs of active bleeding reported    Continue therapies per IPR    combination of toxic metabolic and stroke he already has significant comorbid conditions including previous hemiparesis, chronic kidney disease on peritoneal dialysis.      Continue aspirin Plavix and Lipitor.    Continue Coreg, hydralazine  Insulin meals  Continue oral diet as tolerated.        Continue supportive care    Smoking status: non smoker    Nutrition status: appropriate  Body mass index is 23.25 kg/m². - appropriate weight BMI 18.5-24  DVT Prophylaxis: Heparin 5000 units sub q tid    DISCHARGE PLANNING     The patient's treatment plans were discussed with patient.     Recommendations for Discharge   SW     PT  (TBD pending progress)   OT     SLP         Amos Chacko MD  07/20/2023       None known

## 2023-08-22 NOTE — ASU PATIENT PROFILE, ADULT - NS SC CAGE ALCOHOL CUT DOWN
Pt called stating her insurance has still not approved her mri and was told to reach out to clinical office to inform them of this and see if theres anything that could be done. Pt is currently scheduled for her mri on 8/25. She would like a callback to discuss further. S/w patient and notified her of the above. No no

## 2023-09-16 ENCOUNTER — RX RENEWAL (OUTPATIENT)
Age: 58
End: 2023-09-16

## 2023-11-02 ENCOUNTER — OUTPATIENT (OUTPATIENT)
Dept: OUTPATIENT SERVICES | Facility: HOSPITAL | Age: 58
LOS: 1 days | Discharge: HOME | End: 2023-11-02

## 2023-11-02 DIAGNOSIS — D05.12 INTRADUCTAL CARCINOMA IN SITU OF LEFT BREAST: ICD-10-CM

## 2023-11-02 DIAGNOSIS — Z98.890 OTHER SPECIFIED POSTPROCEDURAL STATES: Chronic | ICD-10-CM

## 2023-11-02 DIAGNOSIS — N39.3 STRESS INCONTINENCE (FEMALE) (MALE): Chronic | ICD-10-CM

## 2023-11-13 ENCOUNTER — APPOINTMENT (OUTPATIENT)
Dept: HEMATOLOGY ONCOLOGY | Facility: CLINIC | Age: 58
End: 2023-11-13
Payer: MEDICAID

## 2023-11-13 ENCOUNTER — RESULT REVIEW (OUTPATIENT)
Age: 58
End: 2023-11-13

## 2023-11-13 VITALS
WEIGHT: 204.59 LBS | OXYGEN SATURATION: 98 % | SYSTOLIC BLOOD PRESSURE: 114 MMHG | HEART RATE: 74 BPM | HEIGHT: 61.97 IN | DIASTOLIC BLOOD PRESSURE: 75 MMHG | TEMPERATURE: 97.1 F | RESPIRATION RATE: 16 BRPM | BODY MASS INDEX: 37.65 KG/M2

## 2023-11-13 LAB
BASOPHILS # BLD AUTO: 0.03 K/UL — SIGNIFICANT CHANGE UP (ref 0–0.2)
BASOPHILS # BLD AUTO: 0.03 K/UL — SIGNIFICANT CHANGE UP (ref 0–0.2)
BASOPHILS NFR BLD AUTO: 0.4 % — SIGNIFICANT CHANGE UP (ref 0–2)
BASOPHILS NFR BLD AUTO: 0.4 % — SIGNIFICANT CHANGE UP (ref 0–2)
EOSINOPHIL # BLD AUTO: 0.1 K/UL — SIGNIFICANT CHANGE UP (ref 0–0.5)
EOSINOPHIL # BLD AUTO: 0.1 K/UL — SIGNIFICANT CHANGE UP (ref 0–0.5)
EOSINOPHIL NFR BLD AUTO: 1.3 % — SIGNIFICANT CHANGE UP (ref 0–6)
EOSINOPHIL NFR BLD AUTO: 1.3 % — SIGNIFICANT CHANGE UP (ref 0–6)
HCT VFR BLD CALC: 40.2 % — SIGNIFICANT CHANGE UP (ref 34.5–45)
HCT VFR BLD CALC: 40.2 % — SIGNIFICANT CHANGE UP (ref 34.5–45)
HGB BLD-MCNC: 12.9 G/DL — SIGNIFICANT CHANGE UP (ref 11.5–15.5)
HGB BLD-MCNC: 12.9 G/DL — SIGNIFICANT CHANGE UP (ref 11.5–15.5)
IMM GRANULOCYTES NFR BLD AUTO: 0.4 % — SIGNIFICANT CHANGE UP (ref 0–0.9)
IMM GRANULOCYTES NFR BLD AUTO: 0.4 % — SIGNIFICANT CHANGE UP (ref 0–0.9)
LYMPHOCYTES # BLD AUTO: 3.32 K/UL — HIGH (ref 1–3.3)
LYMPHOCYTES # BLD AUTO: 3.32 K/UL — HIGH (ref 1–3.3)
LYMPHOCYTES # BLD AUTO: 43.7 % — SIGNIFICANT CHANGE UP (ref 13–44)
LYMPHOCYTES # BLD AUTO: 43.7 % — SIGNIFICANT CHANGE UP (ref 13–44)
MCHC RBC-ENTMCNC: 27.4 PG — SIGNIFICANT CHANGE UP (ref 27–34)
MCHC RBC-ENTMCNC: 27.4 PG — SIGNIFICANT CHANGE UP (ref 27–34)
MCHC RBC-ENTMCNC: 32.1 G/DL — SIGNIFICANT CHANGE UP (ref 32–36)
MCHC RBC-ENTMCNC: 32.1 G/DL — SIGNIFICANT CHANGE UP (ref 32–36)
MCV RBC AUTO: 85.5 FL — SIGNIFICANT CHANGE UP (ref 80–100)
MCV RBC AUTO: 85.5 FL — SIGNIFICANT CHANGE UP (ref 80–100)
MONOCYTES # BLD AUTO: 0.44 K/UL — SIGNIFICANT CHANGE UP (ref 0–0.9)
MONOCYTES # BLD AUTO: 0.44 K/UL — SIGNIFICANT CHANGE UP (ref 0–0.9)
MONOCYTES NFR BLD AUTO: 5.8 % — SIGNIFICANT CHANGE UP (ref 2–14)
MONOCYTES NFR BLD AUTO: 5.8 % — SIGNIFICANT CHANGE UP (ref 2–14)
NEUTROPHILS # BLD AUTO: 3.68 K/UL — SIGNIFICANT CHANGE UP (ref 1.8–7.4)
NEUTROPHILS # BLD AUTO: 3.68 K/UL — SIGNIFICANT CHANGE UP (ref 1.8–7.4)
NEUTROPHILS NFR BLD AUTO: 48.4 % — SIGNIFICANT CHANGE UP (ref 43–77)
NEUTROPHILS NFR BLD AUTO: 48.4 % — SIGNIFICANT CHANGE UP (ref 43–77)
NRBC # BLD: 0 /100 WBCS — SIGNIFICANT CHANGE UP (ref 0–0)
NRBC # BLD: 0 /100 WBCS — SIGNIFICANT CHANGE UP (ref 0–0)
PLATELET # BLD AUTO: 217 K/UL — SIGNIFICANT CHANGE UP (ref 150–400)
PLATELET # BLD AUTO: 217 K/UL — SIGNIFICANT CHANGE UP (ref 150–400)
RBC # BLD: 4.7 M/UL — SIGNIFICANT CHANGE UP (ref 3.8–5.2)
RBC # BLD: 4.7 M/UL — SIGNIFICANT CHANGE UP (ref 3.8–5.2)
RBC # FLD: 14.9 % — HIGH (ref 10.3–14.5)
RBC # FLD: 14.9 % — HIGH (ref 10.3–14.5)
WBC # BLD: 7.6 K/UL — SIGNIFICANT CHANGE UP (ref 3.8–10.5)
WBC # BLD: 7.6 K/UL — SIGNIFICANT CHANGE UP (ref 3.8–10.5)
WBC # FLD AUTO: 7.6 K/UL — SIGNIFICANT CHANGE UP (ref 3.8–10.5)
WBC # FLD AUTO: 7.6 K/UL — SIGNIFICANT CHANGE UP (ref 3.8–10.5)

## 2023-11-13 PROCEDURE — 99214 OFFICE O/P EST MOD 30 MIN: CPT

## 2023-11-13 RX ORDER — TAMOXIFEN CITRATE 10 MG/1
10 TABLET, FILM COATED ORAL
Qty: 90 | Refills: 1 | Status: ACTIVE | COMMUNITY
Start: 2020-01-17 | End: 1900-01-01

## 2023-11-14 LAB
25(OH)D3 SERPL-MCNC: 55 NG/ML
ALBUMIN SERPL ELPH-MCNC: 4.3 G/DL
ALP BLD-CCNC: 80 U/L
ALT SERPL-CCNC: 11 U/L
ANION GAP SERPL CALC-SCNC: 11 MMOL/L
AST SERPL-CCNC: 17 U/L
BILIRUB SERPL-MCNC: 0.2 MG/DL
BUN SERPL-MCNC: 14 MG/DL
CALCIUM SERPL-MCNC: 9.1 MG/DL
CHLORIDE SERPL-SCNC: 100 MMOL/L
CO2 SERPL-SCNC: 26 MMOL/L
CREAT SERPL-MCNC: 0.68 MG/DL
EGFR: 100 ML/MIN/1.73M2
GLUCOSE SERPL-MCNC: 260 MG/DL
POTASSIUM SERPL-SCNC: 4 MMOL/L
PROT SERPL-MCNC: 7.4 G/DL
SODIUM SERPL-SCNC: 138 MMOL/L

## 2023-11-16 ENCOUNTER — RESULT REVIEW (OUTPATIENT)
Age: 58
End: 2023-11-16

## 2023-11-16 ENCOUNTER — APPOINTMENT (OUTPATIENT)
Dept: MAMMOGRAPHY | Facility: IMAGING CENTER | Age: 58
End: 2023-11-16
Payer: MEDICAID

## 2023-11-16 ENCOUNTER — OUTPATIENT (OUTPATIENT)
Dept: OUTPATIENT SERVICES | Facility: HOSPITAL | Age: 58
LOS: 1 days | End: 2023-11-16
Payer: MEDICAID

## 2023-11-16 ENCOUNTER — APPOINTMENT (OUTPATIENT)
Dept: ULTRASOUND IMAGING | Facility: IMAGING CENTER | Age: 58
End: 2023-11-16
Payer: MEDICAID

## 2023-11-16 DIAGNOSIS — Z98.890 OTHER SPECIFIED POSTPROCEDURAL STATES: Chronic | ICD-10-CM

## 2023-11-16 DIAGNOSIS — N39.3 STRESS INCONTINENCE (FEMALE) (MALE): Chronic | ICD-10-CM

## 2023-11-16 DIAGNOSIS — Z00.8 ENCOUNTER FOR OTHER GENERAL EXAMINATION: ICD-10-CM

## 2023-11-16 PROCEDURE — 77063 BREAST TOMOSYNTHESIS BI: CPT | Mod: 26

## 2023-11-16 PROCEDURE — 77067 SCR MAMMO BI INCL CAD: CPT | Mod: 26

## 2023-11-16 PROCEDURE — 76641 ULTRASOUND BREAST COMPLETE: CPT

## 2023-11-16 PROCEDURE — 77067 SCR MAMMO BI INCL CAD: CPT

## 2023-11-16 PROCEDURE — 76641 ULTRASOUND BREAST COMPLETE: CPT | Mod: 26,50

## 2023-11-16 PROCEDURE — 77063 BREAST TOMOSYNTHESIS BI: CPT

## 2024-04-08 ENCOUNTER — APPOINTMENT (OUTPATIENT)
Dept: OBGYN | Facility: HOSPITAL | Age: 59
End: 2024-04-08
Payer: MEDICAID

## 2024-04-08 ENCOUNTER — OUTPATIENT (OUTPATIENT)
Dept: OUTPATIENT SERVICES | Facility: HOSPITAL | Age: 59
LOS: 1 days | End: 2024-04-08

## 2024-04-08 VITALS
HEART RATE: 78 BPM | SYSTOLIC BLOOD PRESSURE: 122 MMHG | BODY MASS INDEX: 36.63 KG/M2 | HEIGHT: 61 IN | DIASTOLIC BLOOD PRESSURE: 68 MMHG | WEIGHT: 194 LBS | TEMPERATURE: 98 F

## 2024-04-08 DIAGNOSIS — Z98.890 OTHER SPECIFIED POSTPROCEDURAL STATES: Chronic | ICD-10-CM

## 2024-04-08 DIAGNOSIS — N39.3 STRESS INCONTINENCE (FEMALE) (MALE): Chronic | ICD-10-CM

## 2024-04-08 PROCEDURE — 99213 OFFICE O/P EST LOW 20 MIN: CPT | Mod: GE

## 2024-04-08 NOTE — REVIEW OF SYSTEMS
DISPLAY PLAN FREE TEXT DISPLAY PLAN FREE TEXT DISPLAY PLAN FREE TEXT DISPLAY PLAN FREE TEXT DISPLAY PLAN FREE TEXT DISPLAY PLAN FREE TEXT [Negative] : Heme/Lymph

## 2024-04-08 NOTE — PHYSICAL EXAM
[Chaperone Present] : A chaperone was present in the examining room during all aspects of the physical examination [Appropriately responsive] : appropriately responsive [Alert] : alert [No Acute Distress] : no acute distress [Examination Of The Breasts] : a normal appearance [] : implants [No Masses] : no breast masses were palpable [Labia Majora] : normal [Normal] : normal [Uterine Adnexae] : normal

## 2024-04-09 DIAGNOSIS — Z00.00 ENCOUNTER FOR GENERAL ADULT MEDICAL EXAMINATION WITHOUT ABNORMAL FINDINGS: ICD-10-CM

## 2024-04-09 DIAGNOSIS — D05.10 INTRADUCTAL CARCINOMA IN SITU OF UNSPECIFIED BREAST: ICD-10-CM

## 2024-04-09 DIAGNOSIS — Z90.710 ACQUIRED ABSENCE OF BOTH CERVIX AND UTERUS: ICD-10-CM

## 2024-04-10 NOTE — HISTORY OF PRESENT ILLNESS
[FreeTextEntry1] : 57yo  LMP 2007 presents to clinic for annual visit. Reports no significant changes since last visit. No pelvic pain or bleeding noted. Feels well overall.   OB: NSVDx4, TOPx2 GYN: supracervical hysterectomy in  for fibroids PMH: DCIS stage 0 s/p lumpectomy in 2019, HLD, T2DM PSH: abdominoplasty, MARIYA, hernia repair, breast augmentation Meds: Tamoxifen. Stopped taking lisinopril, metformin, and simva last year. Follows with PCP  All: NKDA

## 2024-04-10 NOTE — PLAN
[FreeTextEntry1] : 59yo  LMP 2007 presenting to clinic for annual visit. Hx significant for DCIS Stage 0 s/p lumpectomy, on tamoxifen. Feels well, no concerns or complaints over past year.   #healthcare maintenance - Pap in  NILM/HPVneg, next in  - Colonoscopy in , repeat in  - Mammogram in 2023 BIRADS 2, repeat in late  - declined STI testing  - can return in 1 year for annual or sooner if GYN need  Discussed with Dr. Paola Salcedo PGY1

## 2024-04-11 DIAGNOSIS — Z01.419 ENCOUNTER FOR GYNECOLOGICAL EXAMINATION (GENERAL) (ROUTINE) WITHOUT ABNORMAL FINDINGS: ICD-10-CM

## 2024-05-07 ENCOUNTER — APPOINTMENT (OUTPATIENT)
Dept: SURGICAL ONCOLOGY | Facility: CLINIC | Age: 59
End: 2024-05-07
Payer: MEDICAID

## 2024-05-07 VITALS
DIASTOLIC BLOOD PRESSURE: 79 MMHG | OXYGEN SATURATION: 100 % | SYSTOLIC BLOOD PRESSURE: 118 MMHG | HEIGHT: 61 IN | BODY MASS INDEX: 36.25 KG/M2 | HEART RATE: 90 BPM | WEIGHT: 192 LBS

## 2024-05-07 PROCEDURE — 99214 OFFICE O/P EST MOD 30 MIN: CPT

## 2024-05-09 ENCOUNTER — OUTPATIENT (OUTPATIENT)
Dept: OUTPATIENT SERVICES | Facility: HOSPITAL | Age: 59
LOS: 1 days | Discharge: HOME | End: 2024-05-09

## 2024-05-09 DIAGNOSIS — N39.3 STRESS INCONTINENCE (FEMALE) (MALE): Chronic | ICD-10-CM

## 2024-05-09 DIAGNOSIS — Z98.890 OTHER SPECIFIED POSTPROCEDURAL STATES: Chronic | ICD-10-CM

## 2024-05-09 DIAGNOSIS — D05.12 INTRADUCTAL CARCINOMA IN SITU OF LEFT BREAST: ICD-10-CM

## 2024-05-16 ENCOUNTER — RESULT REVIEW (OUTPATIENT)
Age: 59
End: 2024-05-16

## 2024-05-16 ENCOUNTER — APPOINTMENT (OUTPATIENT)
Dept: HEMATOLOGY ONCOLOGY | Facility: CLINIC | Age: 59
End: 2024-05-16
Payer: MEDICAID

## 2024-05-16 DIAGNOSIS — D05.12 INTRADUCTAL CARCINOMA IN SITU OF LEFT BREAST: ICD-10-CM

## 2024-05-16 LAB
BASOPHILS # BLD AUTO: 0.03 K/UL — SIGNIFICANT CHANGE UP (ref 0–0.2)
BASOPHILS NFR BLD AUTO: 0.5 % — SIGNIFICANT CHANGE UP (ref 0–2)
EOSINOPHIL # BLD AUTO: 0.09 K/UL — SIGNIFICANT CHANGE UP (ref 0–0.5)
EOSINOPHIL NFR BLD AUTO: 1.4 % — SIGNIFICANT CHANGE UP (ref 0–6)
HCT VFR BLD CALC: 40.1 % — SIGNIFICANT CHANGE UP (ref 34.5–45)
HGB BLD-MCNC: 12.9 G/DL — SIGNIFICANT CHANGE UP (ref 11.5–15.5)
IMM GRANULOCYTES NFR BLD AUTO: 0.2 % — SIGNIFICANT CHANGE UP (ref 0–0.9)
LYMPHOCYTES # BLD AUTO: 3.46 K/UL — HIGH (ref 1–3.3)
LYMPHOCYTES # BLD AUTO: 52.9 % — HIGH (ref 13–44)
MCHC RBC-ENTMCNC: 28.4 PG — SIGNIFICANT CHANGE UP (ref 27–34)
MCHC RBC-ENTMCNC: 32.2 G/DL — SIGNIFICANT CHANGE UP (ref 32–36)
MCV RBC AUTO: 88.3 FL — SIGNIFICANT CHANGE UP (ref 80–100)
MONOCYTES # BLD AUTO: 0.36 K/UL — SIGNIFICANT CHANGE UP (ref 0–0.9)
MONOCYTES NFR BLD AUTO: 5.5 % — SIGNIFICANT CHANGE UP (ref 2–14)
NEUTROPHILS # BLD AUTO: 2.59 K/UL — SIGNIFICANT CHANGE UP (ref 1.8–7.4)
NEUTROPHILS NFR BLD AUTO: 39.5 % — LOW (ref 43–77)
NRBC # BLD: 0 /100 WBCS — SIGNIFICANT CHANGE UP (ref 0–0)
PLATELET # BLD AUTO: 216 K/UL — SIGNIFICANT CHANGE UP (ref 150–400)
RBC # BLD: 4.54 M/UL — SIGNIFICANT CHANGE UP (ref 3.8–5.2)
RBC # FLD: 13.7 % — SIGNIFICANT CHANGE UP (ref 10.3–14.5)
WBC # BLD: 6.54 K/UL — SIGNIFICANT CHANGE UP (ref 3.8–10.5)
WBC # FLD AUTO: 6.54 K/UL — SIGNIFICANT CHANGE UP (ref 3.8–10.5)

## 2024-05-16 PROCEDURE — 99214 OFFICE O/P EST MOD 30 MIN: CPT

## 2024-05-17 PROBLEM — D05.12 DUCTAL CARCINOMA IN SITU (DCIS) OF LEFT BREAST: Status: ACTIVE | Noted: 2019-07-09

## 2024-05-17 LAB
25(OH)D3 SERPL-MCNC: 41.9 NG/ML
ALBUMIN SERPL ELPH-MCNC: 4.2 G/DL
ALP BLD-CCNC: 73 U/L
ALT SERPL-CCNC: 12 U/L
ANION GAP SERPL CALC-SCNC: 12 MMOL/L
AST SERPL-CCNC: 17 U/L
BILIRUB SERPL-MCNC: 0.3 MG/DL
BUN SERPL-MCNC: 10 MG/DL
CALCIUM SERPL-MCNC: 9.7 MG/DL
CHLORIDE SERPL-SCNC: 102 MMOL/L
CO2 SERPL-SCNC: 26 MMOL/L
CREAT SERPL-MCNC: 0.73 MG/DL
EGFR: 95 ML/MIN/1.73M2
GLUCOSE SERPL-MCNC: 105 MG/DL
POTASSIUM SERPL-SCNC: 4.4 MMOL/L
PROT SERPL-MCNC: 7.5 G/DL
SODIUM SERPL-SCNC: 139 MMOL/L

## 2024-05-17 NOTE — PHYSICAL EXAM
[Fully active, able to carry on all pre-disease performance without restriction] : Status 0 - Fully active, able to carry on all pre-disease performance without restriction [Obese] : obese [Normal] : normal appearance, no rash, nodules, vesicles, ulcers, erythema [de-identified] : bilateral breast augmentation; left breast scar, s/p excisional biopsy- well healed.

## 2024-05-17 NOTE — ASSESSMENT
[FreeTextEntry1] : Ms. BILL 's questions were answered to her satisfaction.  She  expressed her  understanding and willingness to comply with the above recommendations, and  will return to the office in 6 months.

## 2024-05-17 NOTE — HISTORY OF PRESENT ILLNESS
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  CASE SYNOPSIS: 2016:  bilateral breast augmentation with saline implants (Dr. Patel).  11/2/17: Mammogram/sonogram revealed dense breasts indeterminate cluster of calcification in the outer upper left breast for which stereotactic biopsy recommended.  11/24/17- Stereotactic biopsy revealed fibrocystic changes with proliferative features including microcyst formation, adenosis, usual ductal hyperplasia, columnar cell change, associated microcalcification and apocrine metaplasia associated with focal atypia.  10/26/18: Mammogram- left breast calcification unchanged from prior imaging; surgical excision advised. Pathology showed fibrocystic changes, ductal hyperplasia and microcalcification.  5/23/19- left breast lumpectomy; final pathology consistent with focal DCIS, intermediate grade, ER positive GA positive, cribriform with necrosis. Margins negative.  7/23/19- evaluated by RT oncology ( Dr. Hosuer) -DCIS on RT score 0.8 ( low) - no need for XRT.  9/16/19- started Tamoxifen  10/17- 11/19/19- discontinued Tamoxifen due to lower extremity weakness.  10/18/19- mammogram/breast ultrasound: No suspicious mass, microcalcification, or other signs of malignancy identified. Postsurgical changes in the upper outer left breast. Stable scattered benign appearing the right breast calcification; right breast intramammary lymph node. No sonographic evidence of malignancy.  10/27/2020: Bilateral breast ultrasound/mammogram-no mammogram or sonogram evidence of malignancy.   July 2021: interrupts tamoxifen (concerned over side effects)  11/2/2021 bilateral mammogram/breast ultrasound :no mammographic or sonographic evidence of malignancy.  11/15/2022 mammogram/breast US: Dense breasts; no mammographic or sonographic evidence of malignancy in either breast.  No evidence of changes of the suspicious nature since the prior images  [de-identified] : DCIS [de-identified] : ER positive ID positive [FreeTextEntry1] : adjuvant hormonal therapy. [de-identified] : Ms. BILL continues to be stable from clinical perspective, reporting only occasional intermittent breast pain which started in December 2023 during her trip to [facility].  Subsequently she had an ultrasound of the breast which showed no evidence of recurrent disease.  Last mammogram/breast US in November 2023-reportedly negative.  Follows up with plastic surgery.  Has completed abdominal liposuction in March 2023.  Compliant with tamoxifen; reports no venous thrombotic event or vaginal discharge.  Hematologic profile stable.

## 2024-05-22 ENCOUNTER — OUTPATIENT (OUTPATIENT)
Dept: OUTPATIENT SERVICES | Facility: HOSPITAL | Age: 59
LOS: 1 days | End: 2024-05-22
Payer: MEDICAID

## 2024-05-22 ENCOUNTER — APPOINTMENT (OUTPATIENT)
Dept: ULTRASOUND IMAGING | Facility: CLINIC | Age: 59
End: 2024-05-22
Payer: MEDICAID

## 2024-05-22 ENCOUNTER — RESULT REVIEW (OUTPATIENT)
Age: 59
End: 2024-05-22

## 2024-05-22 ENCOUNTER — APPOINTMENT (OUTPATIENT)
Dept: MAMMOGRAPHY | Facility: CLINIC | Age: 59
End: 2024-05-22
Payer: MEDICAID

## 2024-05-22 DIAGNOSIS — N64.4 MASTODYNIA: ICD-10-CM

## 2024-05-22 DIAGNOSIS — N39.3 STRESS INCONTINENCE (FEMALE) (MALE): Chronic | ICD-10-CM

## 2024-05-22 DIAGNOSIS — Z98.890 OTHER SPECIFIED POSTPROCEDURAL STATES: Chronic | ICD-10-CM

## 2024-05-22 PROCEDURE — 77062 BREAST TOMOSYNTHESIS BI: CPT | Mod: 26

## 2024-05-22 PROCEDURE — 77066 DX MAMMO INCL CAD BI: CPT | Mod: 26

## 2024-05-22 PROCEDURE — 76641 ULTRASOUND BREAST COMPLETE: CPT

## 2024-05-22 PROCEDURE — 76641 ULTRASOUND BREAST COMPLETE: CPT | Mod: 26,50

## 2024-05-22 PROCEDURE — 77066 DX MAMMO INCL CAD BI: CPT

## 2024-05-22 PROCEDURE — G0279: CPT

## 2024-05-23 NOTE — CONSULT LETTER
[Dear  ___] : Dear  [unfilled], [Courtesy Letter:] : I had the pleasure of seeing your patient, [unfilled], in my office today. [( Thank you for referring [unfilled] for consultation for _____ )] : Thank you for referring [unfilled] for consultation for [unfilled] [Please see my note below.] : Please see my note below. [Consult Closing:] : Thank you very much for allowing me to participate in the care of this patient.  If you have any questions, please do not hesitate to contact me. [Sincerely,] : Sincerely, [DrDanna  ___] : Dr. SANTILLAN [FreeTextEntry3] : Mateo Crowe MD, CHANDAN, FACS Director of Surgical Oncology- [facility] , Department of Surgery [facility] at [place] [id number] Ph: [phone] Cell: [phone]

## 2024-05-23 NOTE — PHYSICAL EXAM
HPI:   Lisa Hutchins is a 63year old female who presents for a complete physical exam.     Has gained weight since working from home and being less active. Also eating too much sugar.      Last pap: 1/2020 and normal   Last mammogram: 8/2021 and normal diag Antibiotics       OTHER (SEE COMMENTS), HIVES    Comment:She states she was informed by a physician of             possible reaction   Past Medical History:   Diagnosis Date   • Diabetes ([facility] [address]     diagnosed 2008   • Essential hypertension    • History of P EXTREMITIES: no edema    Cholesterol, Total (mg/dL)   Date Value   07/17/2020 138   10/27/2018 150     HDL Cholesterol (mg/dL)   Date Value   07/17/2020 37 (L)   10/27/2018 37     LDL Cholesterol (mg/dL)   Date Value   07/17/2020 76   10/27/2018 84 desires  -Cholesterol screening which she desires  -Recommendation for yearly influenza vaccine  -Need for Tdap once as an adult and Td booster every 10 years  -Need for Zoster vaccine for patients >= 50  -STI screening (GC/Chlamydia/HIV): Not indicated  - [Normal] : supple, no neck mass and thyroid not enlarged [Normal Neck Lymph Nodes] : normal neck lymph nodes  [Normal Supraclavicular Lymph Nodes] : normal supraclavicular lymph nodes [Normal Groin Lymph Nodes] : normal groin lymph nodes [Normal Axillary Lymph Nodes] : normal axillary lymph nodes [Normal] : oriented to person, place and time, with appropriate affect [FreeTextEntry1] : COVID-19 precautions as per [facility] policy was universally followed\par   [de-identified] : B/L breast implants. Complete breast exam performed in supine and upright positions. No palpable masses, tenderness, nipple discharge, inversion, deviation or enlarged axillary or supraclavicular lymph nodes bilaterally.

## 2024-05-23 NOTE — ASSESSMENT
[FreeTextEntry1] : IMP: 53 year old female s/p left breast lumpectomy on 5/23/19 with a final path of focal DCIS intermediate grade (Er/Pr+).  Was on Tamoxifen daily being followed by Dr. Samuels.  History of large hepatic cyst.  MRI Oct 2019 with stable hepatic cyst since 2017 measuring 7cm.  Annual breast imaging Oct 2019 Birads 2.  -b/l mammo/sono 11/2023- BIRADS 2   Plan: - Patient encouraged to visit PCP/Cardiologist to r/o cardiac source of discomfort.  - B/L Mammo/US ordered for new complaint of breast pain  - Follow up with annual mammo/sono 11/2025 - RTO after mammo/US or one year after imaging completed  I have discussed the diagnosis, therapeutic plan and options with the patient at length. Patient expressed verbal understanding to proceed with proposed plan. All questions answered.

## 2024-05-23 NOTE — HISTORY OF PRESENT ILLNESS
[de-identified] : Ms. Zandra Tony is a 55 year old female who presents today for breast exam w/ c/o new b/l breast pain. States pain occurs mostly on right upper, inner breast. Describes pain as "uncomfortable". Denies sharp pain, injury to chest, chest pain, SOB. S/p Left lumpectomy .    Disease: left breast cancer   Pathology: DCIS AJCC Stage: is   Tumor Markers: ER positive SD positive  B/L mammo/sono 2023: No evidence of malignancy. BIRADS 2  24: Pt denies any palpable lumps noted in the breast, axillae, and neck bilaterally. Reports B/L breast pain.   Pertinent hx:  DCIS and liver cyst. She completed her colonoscopy 19 revealing melanosis and hemorrhoids. Recent MMG/US bilateral breast 10/27/20 (BIRADS 2): No mammographic or sonographic evidence of malignancy. No evidence of changes of a suspicious nature since the prior images. US abdomen complete 10/27/20: Simple appearing right hepatic cyst, unchanged.  Today 21: patient complaint of left breast pain after having a massage. Patient denies breasts lump, nipple discharge, inversion of nipple.  12/15/20: Today the pt was w/o any complaints. Denies any pain and denies any irregular eating habits. Denies constitutional symptoms.   Samantha went for her annual breast imaging including mammo/sono in  which revealed calcifications in the upper outer Left breast. She underwent a stereotactic biopsy which revealed Focal Atypia. Samantha states that her PCP attempted to refer her to a surgeon however could not find one who accepts her insurance. She presents today for follow up of her pathology results. She is s/p bilateral breast augmentation with saline implants in  (Dr. Patel).   She went for a repeat mammo/sono on 10/26/18 which once again revealed Left breast calcifications unchanged from prior imaging. Since prior biopsy found focal atypia, surgical excision is advised (BIRADS 4). Final pathology showed fibrocystic changes, usual ductal hyperplasia and microcalcifications.   Previously, she is s/p left breast lumpectomy on 19 with a final path of focal DCIS intermediate grade (Er/Pr+).  She is currently taking Tamoxifen daily under the guidance of Dr. Samuels without complaint.  Deemed not necessary to undergo XRT.  She underwent a b/l mammo/sono 10/31/19 which was without evidence of malignancy (Birads 2).   She underwent a MRI abdomen on 10/31/19 to evaluate her liver cyst which was noted to be stable at 7cm since May 2017.  She denies abdominal discomfort.  Today she is feeling generally well without complaint or concern.   No significant PMH. Denies family history of breast or ovarian cancer.  Menarche: 13 y.o.  (Age at first pregnancy: 23)  Internist: Brandon Echevarria [phone]

## 2024-06-06 ENCOUNTER — APPOINTMENT (OUTPATIENT)
Dept: MAMMOGRAPHY | Facility: CLINIC | Age: 59
End: 2024-06-06

## 2024-06-06 ENCOUNTER — APPOINTMENT (OUTPATIENT)
Dept: ULTRASOUND IMAGING | Facility: CLINIC | Age: 59
End: 2024-06-06

## 2024-06-11 ENCOUNTER — APPOINTMENT (OUTPATIENT)
Dept: SURGICAL ONCOLOGY | Facility: CLINIC | Age: 59
End: 2024-06-11
Payer: MEDICAID

## 2024-06-11 DIAGNOSIS — N64.4 MASTODYNIA: ICD-10-CM

## 2024-06-11 PROCEDURE — 99214 OFFICE O/P EST MOD 30 MIN: CPT

## 2024-06-13 NOTE — REASON FOR VISIT
[Home] : at home, [unfilled] , at the time of the visit. [Medical Office: ([facility])___] : at the medical office located in  [Verbal consent obtained from patient] : the patient, [unfilled] [Follow-Up Visit] : a follow-up visit for [FreeTextEntry2] : bilateral breast pain

## 2024-06-13 NOTE — HISTORY OF PRESENT ILLNESS
[de-identified] : Ms. Zandra Tony is a 55 year old female present for b/l breast pain s/p Mammo/sono assessment. Denies sharp pain, injury to chest, chest pain, SOB. S/p Left lumpectomy 2018.    Disease: left breast cancer   Pathology: DCIS AJCC Stage: is   Tumor Markers: ER positive [place] positive  B/L mammo/sono 2023: No evidence of malignancy. BIRADS 2 24 B/L Mammo/Sono- ANABELL- BIRADs: 2  Pertinent hx:  DCIS and liver cyst. She completed her colonoscopy 19 revealing melanosis and hemorrhoids. Recent MMG/US bilateral breast 10/27/20 (BIRADS 2): No mammographic or sonographic evidence of malignancy. No evidence of changes of a suspicious nature since the prior images. US abdomen complete 10/27/20: Simple appearing right hepatic cyst, unchanged.  She went for a repeat mammo/sono on 10/26/18 which once again revealed Left breast calcifications unchanged from prior imaging. Since prior biopsy found focal atypia, surgical excision is advised (BIRADS 4). Final pathology showed fibrocystic changes, usual ductal hyperplasia and microcalcifications.   Previously, she is s/p left breast lumpectomy on 19 with a final path of focal DCIS intermediate grade (Er/Pr+).  She is currently taking Tamoxifen daily under the guidance of Dr. Samuels without complaint.  Deemed not necessary to undergo XRT.  She underwent a b/l mammo/sono 10/31/19 which was without evidence of malignancy (Birads 2).   No significant PMH. Denies family history of breast or ovarian cancer.  Menarche: 13 y.o.  (Age at first pregnancy: 23)  Internist: Brandon Echevarria [phone]

## 2024-06-13 NOTE — ASSESSMENT
[FreeTextEntry1] : IMP: 53 year old female s/p left breast lumpectomy on 5/23/19 with a final path of focal DCIS intermediate grade (Er/Pr+).  Was on Tamoxifen daily being followed by Dr. Samuels.  History of large hepatic cyst.  MRI Oct 2019 with stable hepatic cyst since 2017 measuring 7cm.  Annual breast imaging Oct 2019 Birads 2.  -b/l mammo/sono 11/2023- BIRADS 2   5/22/24 B/L Mammo/Sono- ANABELL- BIRADs: 2  Plan: - Continue follow-ups with Med/onc for management of Tamoxifen.  - Follow up with annual mammo/sono 11/2024 - RTO after annual imaging  I have discussed the diagnosis, therapeutic plan and options with the patient at length. Patient expressed verbal understanding to proceed with proposed plan. All questions answered.

## 2024-06-13 NOTE — CONSULT LETTER
[Dear  ___] : Dear  [unfilled], [Courtesy Letter:] : I had the pleasure of seeing your patient, [unfilled], in my office today. [( Thank you for referring [unfilled] for consultation for _____ )] : Thank you for referring [unfilled] for consultation for [unfilled] [Please see my note below.] : Please see my note below. [Consult Closing:] : Thank you very much for allowing me to participate in the care of this patient.  If you have any questions, please do not hesitate to contact me. [Sincerely,] : Sincerely, [DrDanna  ___] : Dr. SANTILLAN [FreeTextEntry3] : Mateo Crowe MD, CHANDAN, FACS Director of Surgical Oncology- [facility] , Department of Surgery [facility] [place] [id number] Ph: [phone] Cell: [phone]

## 2024-11-12 ENCOUNTER — APPOINTMENT (OUTPATIENT)
Dept: SURGICAL ONCOLOGY | Facility: CLINIC | Age: 59
End: 2024-11-12

## 2024-11-12 DIAGNOSIS — Z00.00 ENCOUNTER FOR GENERAL ADULT MEDICAL EXAMINATION W/OUT ABNORMAL FINDINGS: ICD-10-CM

## 2024-11-12 DIAGNOSIS — D05.12 INTRADUCTAL CARCINOMA IN SITU OF LEFT BREAST: ICD-10-CM

## 2024-11-17 ENCOUNTER — EMERGENCY (EMERGENCY)
Facility: HOSPITAL | Age: 59
LOS: 0 days | Discharge: HOME | End: 2024-11-17
Attending: EMERGENCY MEDICINE
Payer: MEDICAID

## 2024-11-17 VITALS
SYSTOLIC BLOOD PRESSURE: 128 MMHG | HEART RATE: 78 BPM | OXYGEN SATURATION: 100 % | DIASTOLIC BLOOD PRESSURE: 69 MMHG | TEMPERATURE: 98 F | RESPIRATION RATE: 19 BRPM

## 2024-11-17 VITALS — WEIGHT: 194.89 LBS

## 2024-11-17 DIAGNOSIS — M25.512 PAIN IN LEFT SHOULDER: ICD-10-CM

## 2024-11-17 DIAGNOSIS — E11.9 TYPE 2 DIABETES MELLITUS WITHOUT COMPLICATIONS: ICD-10-CM

## 2024-11-17 DIAGNOSIS — Z98.890 OTHER SPECIFIED POSTPROCEDURAL STATES: Chronic | ICD-10-CM

## 2024-11-17 DIAGNOSIS — Z23 ENCOUNTER FOR IMMUNIZATION: ICD-10-CM

## 2024-11-17 DIAGNOSIS — M54.50 LOW BACK PAIN, UNSPECIFIED: ICD-10-CM
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
## 2024-11-17 DIAGNOSIS — S80.211A ABRASION, RIGHT KNEE, INITIAL ENCOUNTER: ICD-10-CM
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
## 2024-11-17 DIAGNOSIS — D25.9 LEIOMYOMA OF UTERUS, UNSPECIFIED: ICD-10-CM

## 2024-11-17 DIAGNOSIS — S80.02XA CONTUSION OF LEFT KNEE, INITIAL ENCOUNTER: ICD-10-CM

## 2024-11-17 DIAGNOSIS — S00.81XA ABRASION OF OTHER PART OF HEAD, INITIAL ENCOUNTER: ICD-10-CM

## 2024-11-17 DIAGNOSIS — K21.9 GASTRO-ESOPHAGEAL REFLUX DISEASE WITHOUT ESOPHAGITIS: ICD-10-CM

## 2024-11-17 DIAGNOSIS — M25.511 PAIN IN RIGHT SHOULDER: ICD-10-CM

## 2024-11-17 DIAGNOSIS — N39.3 STRESS INCONTINENCE (FEMALE) (MALE): Chronic | ICD-10-CM

## 2024-11-17 PROCEDURE — 99283 EMERGENCY DEPT VISIT LOW MDM: CPT | Mod: 25

## 2024-11-17 PROCEDURE — 90471 IMMUNIZATION ADMIN: CPT

## 2024-11-17 PROCEDURE — 99284 EMERGENCY DEPT VISIT MOD MDM: CPT

## 2024-11-17 PROCEDURE — 73562 X-RAY EXAM OF KNEE 3: CPT | Mod: 50

## 2024-11-17 PROCEDURE — 73562 X-RAY EXAM OF KNEE 3: CPT | Mod: 26,50

## 2024-11-17 PROCEDURE — 90715 TDAP VACCINE 7 YRS/> IM: CPT

## 2024-11-17 RX ORDER — CLOSTRIDIUM TETANI TOXOID ANTIGEN (FORMALDEHYDE INACTIVATED), CORYNEBACTERIUM DIPHTHERIAE TOXOID ANTIGEN (FORMALDEHYDE INACTIVATED), BORDETELLA PERTUSSIS TOXOID ANTIGEN (GLUTARALDEHYDE INACTIVATED), BORDETELLA PERTUSSIS FILAMENTOUS HEMAGGLUTININ ANTIGEN (FORMALDEHYDE INACTIVATED), BORDETELLA PERTUSSIS PERTACTIN ANTIGEN, AND BORDETELLA PERTUSSIS FIMBRIAE 2/3 ANTIGEN 5; 2; 2.5; 5; 3; 5 [LF]/.5ML; [LF]/.5ML; UG/.5ML; UG/.5ML; UG/.5ML; UG/.5ML
0.5 INJECTION, SUSPENSION INTRAMUSCULAR ONCE
Refills: 0 | Status: COMPLETED | OUTPATIENT
Start: 2024-11-17 | End: 2024-11-17

## 2024-11-17 RX ORDER — IBUPROFEN 200 MG
600 TABLET ORAL ONCE
Refills: 0 | Status: COMPLETED | OUTPATIENT
Start: 2024-11-17 | End: 2024-11-17

## 2024-11-17 RX ADMIN — CLOSTRIDIUM TETANI TOXOID ANTIGEN (FORMALDEHYDE INACTIVATED), CORYNEBACTERIUM DIPHTHERIAE TOXOID ANTIGEN (FORMALDEHYDE INACTIVATED), BORDETELLA PERTUSSIS TOXOID ANTIGEN (GLUTARALDEHYDE INACTIVATED), BORDETELLA PERTUSSIS FILAMENTOUS HEMAGGLUTININ ANTIGEN (FORMALDEHYDE INACTIVATED), BORDETELLA PERTUSSIS PERTACTIN ANTIGEN, AND BORDETELLA PERTUSSIS FIMBRIAE 2/3 ANTIGEN 0.5 MILLILITER(S): 5; 2; 2.5; 5; 3; 5 INJECTION, SUSPENSION INTRAMUSCULAR at 13:11

## 2024-11-17 RX ADMIN — Medication 600 MILLIGRAM(S): at 13:11

## 2024-11-17 NOTE — ED STATDOCS - OBJECTIVE STATEMENT
59 year old female with a PMHx of DM Type 2, and GERD presenting to the ED s/p mechanical trip and fall over a shoe at home and fell face first into a railing. There is a abrasion to the left cheek. Pt is reporting pain in both knees, shoulders, lower back. Pt denies headaches, nausea, neck pain. Pt is ambulatory. Pt did not take any pain meds PTA. Pt believes she is due to update Tetanus. Pt is not on blood thinners.

## 2024-11-17 NOTE — ED ADULT NURSE NOTE - NSICDXPASTSURGICALHX_GEN_ALL_CORE_FT
PAST SURGICAL HISTORY:  Female stress incontinence Interstem insertion and removed 1/2017    H/O abdominoplasty 9/2016 - [facility]    H/O microdiscectomy 1/2019    H/O: hysterectomy 2007    History of augmentation of both breasts     Right knee arthroscopy 1998, left knee arthroscopy 2005 1998    S/P Ankle Ligament Repair Left-8/20/10    S/P MARIYA-BSO 2007    Tubal Ligation (ICD9 V26.51) 1997 1997    Urinary incontinence vaginal sling in 2011 and removed 1/2017    Uterine artery embolization, Vaginal sling 01/2007

## 2024-11-17 NOTE — ED STATDOCS - PROGRESS NOTE DETAILS
59-year-old female with a past medical history of liver cyst, incisional hernia, uterine leiomyoma, anxiety, GERD, diabetes presents with myalgias and left facial abrasion.  Patient states that she was on her way out and tripped over her shoes and fell forward hitting her face on the handrails and landing on her knees.  Last Tdap is unknown.  Abrasion noticed to the left cheek and smaller abrasion noted just inferior to the right knee.  Small area of ecchymosis to the medial aspect of the left knee with mild tense palpation.  Full range of motion of the upper and lower extremities.    Will check x-ray, update tetanus, pain meds and reevaluate.  No area that needs a laceration repair but will clean abrasion and dressed appropriately. -Charlie Mantilla PA-C Wound cleaned with betadine and NS. Placed bacitracin and covered with bandaid. -Charlie Mantilla PA-C

## 2024-11-17 NOTE — ED STATDOCS - PHYSICAL EXAMINATION
Constitutional: NAD AOx3  Eyes: PERRL EOMI  Head: Normocephalic atraumatic  Mouth: MMM  Cardiac: regular rate and rhythm  Resp: Lungs CTAB  GI: Abd s/nd/nt  Neuro: CN2-12 grossly intact, MONTOYA x 4  Skin: No visible rashes  MSK: left cheek - abrasion round and approximately 2cm in diameter, bilateral elbows within normal limits, knees - the right knee has a small abrasion inferior to the patella, left knee contusion medial to the patella

## 2024-11-17 NOTE — ED STATDOCS - PATIENT PORTAL LINK FT
You can access the FollowMyHealth Patient Portal offered by [facility] by registering at the following website: [email or web address]. By joining [employer]’s FollowMyHealth portal, you will also be able to view your health information using other applications (apps) compatible with our system.

## 2024-11-17 NOTE — ED ADULT TRIAGE NOTE - CHIEF COMPLAINT QUOTE
mechanical trip and fall over a shoe, fell face first into a railing. lac to left cheek. reporting pain in both knees, shoulders, lower back

## 2024-11-17 NOTE — ED ADULT NURSE NOTE - NSFALLRISKINTERV_ED_ALL_ED

## 2024-11-17 NOTE — ED STATDOCS - NSICDXPASTSURGICALHX_GEN_ALL_CORE_FT
PAST SURGICAL HISTORY:  Female stress incontinence Interstem insertion and removed 1/2017    H/O abdominoplasty 9/2016 - [facility]    H/O microdiscectomy 1/2019    H/O: hysterectomy 2007    History of augmentation of both breasts     Right knee arthroscopy 1998, left knee arthroscopy 2005 1998    S/P Ankle Ligament Repair Left-8/20/10    S/P MARIYA-BSO 2007    Tubal Ligation (ICD9 V26.51) 1997 1997    Urinary incontinence vaginal sling in 2011 and removed 1/2017    Uterine artery embolization, Vaginal sling 01/2007

## 2024-11-19 ENCOUNTER — APPOINTMENT (OUTPATIENT)
Dept: HEMATOLOGY ONCOLOGY | Facility: CLINIC | Age: 59
End: 2024-11-19

## 2024-11-20 ENCOUNTER — OUTPATIENT (OUTPATIENT)
Dept: OUTPATIENT SERVICES | Facility: HOSPITAL | Age: 59
LOS: 1 days | End: 2024-11-20
Payer: MEDICAID

## 2024-11-20 ENCOUNTER — RESULT REVIEW (OUTPATIENT)
Age: 59
End: 2024-11-20

## 2024-11-20 ENCOUNTER — APPOINTMENT (OUTPATIENT)
Dept: MAMMOGRAPHY | Facility: CLINIC | Age: 59
End: 2024-11-20
Payer: MEDICAID

## 2024-11-20 ENCOUNTER — APPOINTMENT (OUTPATIENT)
Dept: ULTRASOUND IMAGING | Facility: CLINIC | Age: 59
End: 2024-11-20
Payer: MEDICAID

## 2024-11-20 DIAGNOSIS — Z98.890 OTHER SPECIFIED POSTPROCEDURAL STATES: Chronic | ICD-10-CM

## 2024-11-20 DIAGNOSIS — N39.3 STRESS INCONTINENCE (FEMALE) (MALE): Chronic | ICD-10-CM

## 2024-11-20 DIAGNOSIS — D05.12 INTRADUCTAL CARCINOMA IN SITU OF LEFT BREAST: ICD-10-CM

## 2024-11-20 PROCEDURE — 77062 BREAST TOMOSYNTHESIS BI: CPT | Mod: 26

## 2024-11-20 PROCEDURE — 77066 DX MAMMO INCL CAD BI: CPT | Mod: 26

## 2024-11-20 PROCEDURE — 76641 ULTRASOUND BREAST COMPLETE: CPT | Mod: 26,50

## 2024-11-20 PROCEDURE — 77066 DX MAMMO INCL CAD BI: CPT

## 2024-11-20 PROCEDURE — G0279: CPT

## 2024-11-20 PROCEDURE — 76641 ULTRASOUND BREAST COMPLETE: CPT

## 2024-11-25 ENCOUNTER — APPOINTMENT (OUTPATIENT)
Dept: SURGICAL ONCOLOGY | Facility: CLINIC | Age: 59
End: 2024-11-25

## 2024-11-25 DIAGNOSIS — Z00.00 ENCOUNTER FOR GENERAL ADULT MEDICAL EXAMINATION W/OUT ABNORMAL FINDINGS: ICD-10-CM

## 2024-11-25 PROCEDURE — 99442: CPT | Mod: 93

## 2024-11-26 ENCOUNTER — APPOINTMENT (OUTPATIENT)
Dept: SURGICAL ONCOLOGY | Facility: CLINIC | Age: 59
End: 2024-11-26

## 2024-11-29 ENCOUNTER — OUTPATIENT (OUTPATIENT)
Dept: OUTPATIENT SERVICES | Facility: HOSPITAL | Age: 59
LOS: 1 days | Discharge: HOME | End: 2024-11-29

## 2024-11-29 DIAGNOSIS — Z98.890 OTHER SPECIFIED POSTPROCEDURAL STATES: Chronic | ICD-10-CM

## 2024-11-29 DIAGNOSIS — D05.12 INTRADUCTAL CARCINOMA IN SITU OF LEFT BREAST: ICD-10-CM

## 2024-11-29 DIAGNOSIS — N39.3 STRESS INCONTINENCE (FEMALE) (MALE): Chronic | ICD-10-CM

## 2024-12-03 ENCOUNTER — APPOINTMENT (OUTPATIENT)
Dept: HEMATOLOGY ONCOLOGY | Facility: CLINIC | Age: 59
End: 2024-12-03
Payer: MEDICAID

## 2024-12-03 ENCOUNTER — RESULT REVIEW (OUTPATIENT)
Age: 59
End: 2024-12-03

## 2024-12-03 DIAGNOSIS — D05.12 INTRADUCTAL CARCINOMA IN SITU OF LEFT BREAST: ICD-10-CM

## 2024-12-03 LAB
25(OH)D3 SERPL-MCNC: 52.3 NG/ML
ALBUMIN SERPL ELPH-MCNC: 4 G/DL
ALP BLD-CCNC: 63 U/L
ALT SERPL-CCNC: 12 U/L
ANION GAP SERPL CALC-SCNC: 14 MMOL/L
AST SERPL-CCNC: 15 U/L
BASOPHILS # BLD AUTO: 0.03 K/UL — SIGNIFICANT CHANGE UP (ref 0–0.2)
BASOPHILS NFR BLD AUTO: 0.5 % — SIGNIFICANT CHANGE UP (ref 0–2)
BILIRUB SERPL-MCNC: 0.2 MG/DL
BUN SERPL-MCNC: 9 MG/DL
CALCIUM SERPL-MCNC: 9.4 MG/DL
CHLORIDE SERPL-SCNC: 102 MMOL/L
CO2 SERPL-SCNC: 25 MMOL/L
CREAT SERPL-MCNC: 0.77 MG/DL
EGFR: 88 ML/MIN/1.73M2
EOSINOPHIL # BLD AUTO: 0.1 K/UL — SIGNIFICANT CHANGE UP (ref 0–0.5)
EOSINOPHIL NFR BLD AUTO: 1.8 % — SIGNIFICANT CHANGE UP (ref 0–6)
GLUCOSE SERPL-MCNC: 80 MG/DL
HCT VFR BLD CALC: 39.3 % — SIGNIFICANT CHANGE UP (ref 34.5–45)
HGB BLD-MCNC: 12.6 G/DL — SIGNIFICANT CHANGE UP (ref 11.5–15.5)
IMM GRANULOCYTES NFR BLD AUTO: 0.2 % — SIGNIFICANT CHANGE UP (ref 0–0.9)
LYMPHOCYTES # BLD AUTO: 2.79 K/UL — SIGNIFICANT CHANGE UP (ref 1–3.3)
LYMPHOCYTES # BLD AUTO: 49.6 % — HIGH (ref 13–44)
MCHC RBC-ENTMCNC: 28.8 PG — SIGNIFICANT CHANGE UP (ref 27–34)
MCHC RBC-ENTMCNC: 32.1 G/DL — SIGNIFICANT CHANGE UP (ref 32–36)
MCV RBC AUTO: 89.7 FL — SIGNIFICANT CHANGE UP (ref 80–100)
MONOCYTES # BLD AUTO: 0.37 K/UL — SIGNIFICANT CHANGE UP (ref 0–0.9)
MONOCYTES NFR BLD AUTO: 6.6 % — SIGNIFICANT CHANGE UP (ref 2–14)
NEUTROPHILS # BLD AUTO: 2.32 K/UL — SIGNIFICANT CHANGE UP (ref 1.8–7.4)
NEUTROPHILS NFR BLD AUTO: 41.3 % — LOW (ref 43–77)
NRBC # BLD: 0 /100 WBCS — SIGNIFICANT CHANGE UP (ref 0–0)
NRBC BLD-RTO: 0 /100 WBCS — SIGNIFICANT CHANGE UP (ref 0–0)
PLATELET # BLD AUTO: 199 K/UL — SIGNIFICANT CHANGE UP (ref 150–400)
POTASSIUM SERPL-SCNC: 4.4 MMOL/L
PROT SERPL-MCNC: 7.2 G/DL
RBC # BLD: 4.38 M/UL — SIGNIFICANT CHANGE UP (ref 3.8–5.2)
RBC # FLD: 14.5 % — SIGNIFICANT CHANGE UP (ref 10.3–14.5)
SODIUM SERPL-SCNC: 141 MMOL/L
WBC # BLD: 5.62 K/UL — SIGNIFICANT CHANGE UP (ref 3.8–10.5)
WBC # FLD AUTO: 5.62 K/UL — SIGNIFICANT CHANGE UP (ref 3.8–10.5)

## 2024-12-03 PROCEDURE — 99214 OFFICE O/P EST MOD 30 MIN: CPT

## 2025-02-21 NOTE — ED ADULT TRIAGE NOTE - MEANS OF ARRIVAL
"Chief Complaint  Essential hypertension    Subjective        Alesia Resendez presents to [facility]  History of Present Illness  Pleasant patient here today follow-up hypertension controlled nicely at home, less than 130 comes up at the doctor's office she is quite reliable  She had no confusion slurred speech weakness no paresthesias no vision loss change, no focal weakness  .  History of some vertigo quite a few years ago she was diagnosed with BPV, Epley maneuver helped at that time, saw ENT this and been sometime  She had no new onset headaches or other difficulties but she does have more of a head positional head turning whether she is in bed, or walking when she changes position or stands position, can get a difficulty with balance and a vertigo-like sensation of dizziness,  It is more head positional and orthostatic and she does not believe it is related to blood pressure,  Has been intermittent but lost month or so  Is also under additional stress, has custody of her granddaughter, has a good relationship with her, but just life stress as well as unfortunately the granddaughters daughter who is patient's daughter,  Is threatening to get a  excetra when she was unable to take care of her daughter properly  And this is just additional stress,    No racing heart chest pain shortness of breath near syncope or syncope  Back Pain        Objective   Vital Signs:  /88   Pulse 91   Temp 97.3 °F (36.3 °C) (Infrared)   Resp 14   Ht 172.7 cm (68\")   Wt 129 kg (283 lb 4.8 oz)   SpO2 93%   BMI 43.08 kg/m²   Estimated body mass index is 43.08 kg/m² as calculated from the following:    Height as of this encounter: 172.7 cm (68\").    Weight as of this encounter: 129 kg (283 lb 4.8 oz).            Physical Exam  Vitals reviewed.   Constitutional:       Appearance: Normal appearance. She is well-developed.      Comments: Pleasant appears well   HENT:      Head: Normocephalic. "      Nose: Nose normal.   Eyes:      General: No scleral icterus.     Conjunctiva/sclera: Conjunctivae normal.      Pupils: Pupils are equal, round, and reactive to light.   Neck:      Thyroid: No thyromegaly.      Vascular: No JVD.   Cardiovascular:      Rate and Rhythm: Normal rate and regular rhythm.      Heart sounds: Normal heart sounds. No murmur heard.     No friction rub. No gallop.   Pulmonary:      Effort: Pulmonary effort is normal. No respiratory distress.      Breath sounds: Normal breath sounds. No stridor. No wheezing or rales.   Abdominal:      General: Bowel sounds are normal. There is no distension.      Palpations: Abdomen is soft.      Tenderness: There is no abdominal tenderness.      Comments: No hepatosplenomegaly, no ascites,   Musculoskeletal:         General: No tenderness.      Cervical back: Neck supple.   Lymphadenopathy:      Cervical: No cervical adenopathy.   Skin:     General: Skin is warm and dry.      Findings: No erythema or rash.   Neurological:      General: No focal deficit present.      Mental Status: She is alert and oriented to person, place, and time. Mental status is at baseline.      Deep Tendon Reflexes: Reflexes are normal and symmetric.   Psychiatric:         Mood and Affect: Mood normal.         Behavior: Behavior normal.         Thought Content: Thought content normal.         Judgment: Judgment normal.      Comments: Appropriate, good eye contact dress appropriate, warm personality.        Result Review :                Assessment and Plan   Diagnoses and all orders for this visit:    1. Vertigo (Primary)  -     Ambulatory Referral to Physical Therapy for Evaluation & Treatment  -     Ambulatory Referral to ENT (Otolaryngology)  -     CBC & Differential; Future  -     Comprehensive Metabolic Panel; Future  -     Lipid Panel With LDL / HDL Ratio; Future  -     TSH Rfx On Abnormal To Free T4; Future  -     Urinalysis With Microscopic If Indicated (No Culture) - Urine,  Clean Catch; Future    2. Benign paroxysmal positional vertigo, unspecified laterality  -     Ambulatory Referral to Physical Therapy for Evaluation & Treatment  -     Ambulatory Referral to ENT (Otolaryngology)  -     CBC & Differential; Future  -     Comprehensive Metabolic Panel; Future  -     Lipid Panel With LDL / HDL Ratio; Future  -     TSH Rfx On Abnormal To Free T4; Future  -     Urinalysis With Microscopic If Indicated (No Culture) - Urine, Clean Catch; Future    3. Dizziness  -     MRI Brain Without Contrast  -     CBC & Differential; Future  -     Comprehensive Metabolic Panel; Future  -     Lipid Panel With LDL / HDL Ratio; Future  -     TSH Rfx On Abnormal To Free T4; Future  -     Urinalysis With Microscopic If Indicated (No Culture) - Urine, Clean Catch; Future    4. Prediabetes  -     CBC & Differential; Future  -     Comprehensive Metabolic Panel; Future  -     Lipid Panel With LDL / HDL Ratio; Future  -     TSH Rfx On Abnormal To Free T4; Future  -     Urinalysis With Microscopic If Indicated (No Culture) - Urine, Clean Catch; Future  -     Hemoglobin A1c; Future    5. Essential hypertension  -     CBC & Differential; Future  -     Comprehensive Metabolic Panel; Future  -     Lipid Panel With LDL / HDL Ratio; Future  -     TSH Rfx On Abnormal To Free T4; Future  -     Urinalysis With Microscopic If Indicated (No Culture) - Urine, Clean Catch; Future    6. Situational anxiety    Other orders  -     escitalopram (LEXAPRO) 10 MG tablet; Take 1 tablet by mouth Daily.  Dispense: 90 tablet; Refill: 1             Follow Up   Return in about 3 weeks (around 3/14/2025) for Labs before next visit.  Patient was given instructions and counseling regarding her condition or for health maintenance advice. Please see specific information pulled into the AVS if appropriate.   Patient still has meclizine at home  Patient Instructions   Discharge instructions, deep breathing meditation relaxation  Good support  family friends  Continue, weekly [employer],  105.9    Continue meclizine as needed,  If dizzy do not drive operate heavy machinery or climb swim alone excetra    Caution with body position change or walking,  If severe dizziness weakness slurred speech confusion 911    Recheck here in 3 weeks, sooner for problems  Increase Lexapro 7/2 mg daily for 1 or 2 weeks  then 10 mg daily  Increase        ambulatory

## 2025-04-09 ENCOUNTER — APPOINTMENT (OUTPATIENT)
Dept: OBGYN | Facility: HOSPITAL | Age: 60
End: 2025-04-09

## 2025-06-13 ENCOUNTER — OUTPATIENT (OUTPATIENT)
Dept: OUTPATIENT SERVICES | Facility: HOSPITAL | Age: 60
LOS: 1 days | Discharge: HOME | End: 2025-06-13

## 2025-06-13 DIAGNOSIS — Z98.890 OTHER SPECIFIED POSTPROCEDURAL STATES: Chronic | ICD-10-CM

## 2025-06-13 DIAGNOSIS — D05.12 INTRADUCTAL CARCINOMA IN SITU OF LEFT BREAST: ICD-10-CM

## 2025-06-13 DIAGNOSIS — N39.3 STRESS INCONTINENCE (FEMALE) (MALE): Chronic | ICD-10-CM

## 2025-06-16 ENCOUNTER — APPOINTMENT (OUTPATIENT)
Dept: HEMATOLOGY ONCOLOGY | Facility: CLINIC | Age: 60
End: 2025-06-16

## 2025-06-24 ENCOUNTER — RESULT REVIEW (OUTPATIENT)
Age: 60
End: 2025-06-24

## 2025-06-24 ENCOUNTER — APPOINTMENT (OUTPATIENT)
Dept: HEMATOLOGY ONCOLOGY | Facility: CLINIC | Age: 60
End: 2025-06-24
Payer: MEDICAID

## 2025-06-24 VITALS
DIASTOLIC BLOOD PRESSURE: 70 MMHG | OXYGEN SATURATION: 99 % | BODY MASS INDEX: 34.57 KG/M2 | HEIGHT: 62.36 IN | TEMPERATURE: 97.8 F | HEART RATE: 62 BPM | WEIGHT: 190.26 LBS | SYSTOLIC BLOOD PRESSURE: 118 MMHG | RESPIRATION RATE: 16 BRPM

## 2025-06-24 LAB
25(OH)D3 SERPL-MCNC: 54.8 NG/ML
ALBUMIN SERPL ELPH-MCNC: 4.4 G/DL
ALP BLD-CCNC: 69 U/L
ALT SERPL-CCNC: 16 U/L
ANION GAP SERPL CALC-SCNC: 13 MMOL/L
AST SERPL-CCNC: 19 U/L
BASOPHILS # BLD AUTO: 0.03 K/UL — SIGNIFICANT CHANGE UP (ref 0–0.2)
BASOPHILS NFR BLD AUTO: 0.4 % — SIGNIFICANT CHANGE UP (ref 0–2)
BILIRUB SERPL-MCNC: 0.2 MG/DL
BUN SERPL-MCNC: 14 MG/DL
CALCIUM SERPL-MCNC: 9.7 MG/DL
CHLORIDE SERPL-SCNC: 102 MMOL/L
CO2 SERPL-SCNC: 24 MMOL/L
CREAT SERPL-MCNC: 0.8 MG/DL
EGFRCR SERPLBLD CKD-EPI 2021: 84 ML/MIN/1.73M2
EOSINOPHIL # BLD AUTO: 0.16 K/UL — SIGNIFICANT CHANGE UP (ref 0–0.5)
EOSINOPHIL NFR BLD AUTO: 2.1 % — SIGNIFICANT CHANGE UP (ref 0–6)
GLUCOSE SERPL-MCNC: 90 MG/DL
HCT VFR BLD CALC: 39.3 % — SIGNIFICANT CHANGE UP (ref 34.5–45)
HGB BLD-MCNC: 12.6 G/DL — SIGNIFICANT CHANGE UP (ref 11.5–15.5)
IMM GRANULOCYTES NFR BLD AUTO: 0.1 % — SIGNIFICANT CHANGE UP (ref 0–0.9)
LYMPHOCYTES # BLD AUTO: 4.12 K/UL — HIGH (ref 1–3.3)
LYMPHOCYTES # BLD AUTO: 53.4 % — HIGH (ref 13–44)
MCHC RBC-ENTMCNC: 27.8 PG — SIGNIFICANT CHANGE UP (ref 27–34)
MCHC RBC-ENTMCNC: 32.1 G/DL — SIGNIFICANT CHANGE UP (ref 32–36)
MCV RBC AUTO: 86.8 FL — SIGNIFICANT CHANGE UP (ref 80–100)
MONOCYTES # BLD AUTO: 0.43 K/UL — SIGNIFICANT CHANGE UP (ref 0–0.9)
MONOCYTES NFR BLD AUTO: 5.6 % — SIGNIFICANT CHANGE UP (ref 2–14)
NEUTROPHILS # BLD AUTO: 2.97 K/UL — SIGNIFICANT CHANGE UP (ref 1.8–7.4)
NEUTROPHILS NFR BLD AUTO: 38.4 % — LOW (ref 43–77)
NRBC BLD AUTO-RTO: 0 /100 WBCS — SIGNIFICANT CHANGE UP (ref 0–0)
PLATELET # BLD AUTO: 204 K/UL — SIGNIFICANT CHANGE UP (ref 150–400)
POTASSIUM SERPL-SCNC: 4.1 MMOL/L
PROT SERPL-MCNC: 7.8 G/DL
RBC # BLD: 4.53 M/UL — SIGNIFICANT CHANGE UP (ref 3.8–5.2)
RBC # FLD: 15.3 % — HIGH (ref 10.3–14.5)
SODIUM SERPL-SCNC: 139 MMOL/L
WBC # BLD: 7.72 K/UL — SIGNIFICANT CHANGE UP (ref 3.8–10.5)
WBC # FLD AUTO: 7.72 K/UL — SIGNIFICANT CHANGE UP (ref 3.8–10.5)

## 2025-06-24 PROCEDURE — 99214 OFFICE O/P EST MOD 30 MIN: CPT

## 2025-08-18 ENCOUNTER — EMERGENCY (EMERGENCY)
Facility: HOSPITAL | Age: 60
LOS: 0 days | Discharge: HOME | End: 2025-08-18
Attending: EMERGENCY MEDICINE
Payer: MEDICAID

## 2025-08-18 VITALS
TEMPERATURE: 98 F | DIASTOLIC BLOOD PRESSURE: 60 MMHG | SYSTOLIC BLOOD PRESSURE: 149 MMHG | OXYGEN SATURATION: 99 % | HEART RATE: 66 BPM | RESPIRATION RATE: 18 BRPM

## 2025-08-18 VITALS — HEIGHT: 62.36 IN | WEIGHT: 184.97 LBS

## 2025-08-18 DIAGNOSIS — Z98.890 OTHER SPECIFIED POSTPROCEDURAL STATES: Chronic | ICD-10-CM

## 2025-08-18 DIAGNOSIS — N39.3 STRESS INCONTINENCE (FEMALE) (MALE): Chronic | ICD-10-CM

## 2025-08-18 DIAGNOSIS — M54.50 LOW BACK PAIN, UNSPECIFIED: ICD-10-CM

## 2025-08-18 LAB
ALBUMIN SERPL ELPH-MCNC: 3.5 G/DL — SIGNIFICANT CHANGE UP (ref 3.3–5)
ALP SERPL-CCNC: 64 U/L — SIGNIFICANT CHANGE UP (ref 40–120)
ALT FLD-CCNC: 17 U/L — SIGNIFICANT CHANGE UP (ref 12–78)
ANION GAP SERPL CALC-SCNC: 5 MMOL/L — SIGNIFICANT CHANGE UP (ref 5–17)
AST SERPL-CCNC: 12 U/L — LOW (ref 15–37)
BASOPHILS # BLD AUTO: 0.04 K/UL — SIGNIFICANT CHANGE UP (ref 0–0.2)
BASOPHILS NFR BLD AUTO: 0.5 % — SIGNIFICANT CHANGE UP (ref 0–2)
BILIRUB SERPL-MCNC: 0.2 MG/DL — SIGNIFICANT CHANGE UP (ref 0.2–1.2)
BUN SERPL-MCNC: 16 MG/DL — SIGNIFICANT CHANGE UP (ref 7–23)
CALCIUM SERPL-MCNC: 9 MG/DL — SIGNIFICANT CHANGE UP (ref 8.5–10.1)
CHLORIDE SERPL-SCNC: 105 MMOL/L — SIGNIFICANT CHANGE UP (ref 96–108)
CO2 SERPL-SCNC: 28 MMOL/L — SIGNIFICANT CHANGE UP (ref 22–31)
CREAT SERPL-MCNC: 0.96 MG/DL — SIGNIFICANT CHANGE UP (ref 0.5–1.3)
EGFR: 68 ML/MIN/1.73M2 — SIGNIFICANT CHANGE UP
EGFR: 68 ML/MIN/1.73M2 — SIGNIFICANT CHANGE UP
EOSINOPHIL # BLD AUTO: 0.25 K/UL — SIGNIFICANT CHANGE UP (ref 0–0.5)
EOSINOPHIL NFR BLD AUTO: 3.2 % — SIGNIFICANT CHANGE UP (ref 0–6)
GLUCOSE SERPL-MCNC: 97 MG/DL — SIGNIFICANT CHANGE UP (ref 70–99)
HCT VFR BLD CALC: 39.8 % — SIGNIFICANT CHANGE UP (ref 34.5–45)
HGB BLD-MCNC: 12.3 G/DL — SIGNIFICANT CHANGE UP (ref 11.5–15.5)
IMM GRANULOCYTES # BLD AUTO: 0.01 K/UL — SIGNIFICANT CHANGE UP (ref 0–0.07)
IMM GRANULOCYTES NFR BLD AUTO: 0.1 % — SIGNIFICANT CHANGE UP (ref 0–0.9)
LIDOCAIN IGE QN: 40 U/L — SIGNIFICANT CHANGE UP (ref 13–75)
LYMPHOCYTES # BLD AUTO: 3.97 K/UL — HIGH (ref 1–3.3)
LYMPHOCYTES NFR BLD AUTO: 50.1 % — HIGH (ref 13–44)
MCHC RBC-ENTMCNC: 27.8 PG — SIGNIFICANT CHANGE UP (ref 27–34)
MCHC RBC-ENTMCNC: 30.9 G/DL — LOW (ref 32–36)
MCV RBC AUTO: 90 FL — SIGNIFICANT CHANGE UP (ref 80–100)
MONOCYTES # BLD AUTO: 0.45 K/UL — SIGNIFICANT CHANGE UP (ref 0–0.9)
MONOCYTES NFR BLD AUTO: 5.7 % — SIGNIFICANT CHANGE UP (ref 2–14)
NEUTROPHILS # BLD AUTO: 3.2 K/UL — SIGNIFICANT CHANGE UP (ref 1.8–7.4)
NEUTROPHILS NFR BLD AUTO: 40.4 % — LOW (ref 43–77)
NRBC # BLD AUTO: 0 K/UL — SIGNIFICANT CHANGE UP (ref 0–0)
NRBC # FLD: 0 K/UL — SIGNIFICANT CHANGE UP (ref 0–0)
NRBC BLD AUTO-RTO: 0 /100 WBCS — SIGNIFICANT CHANGE UP (ref 0–0)
PLATELET # BLD AUTO: 199 K/UL — SIGNIFICANT CHANGE UP (ref 150–400)
PMV BLD: 10.3 FL — SIGNIFICANT CHANGE UP (ref 7–13)
POTASSIUM SERPL-MCNC: 4 MMOL/L — SIGNIFICANT CHANGE UP (ref 3.5–5.3)
POTASSIUM SERPL-SCNC: 4 MMOL/L — SIGNIFICANT CHANGE UP (ref 3.5–5.3)
PROT SERPL-MCNC: 7.5 GM/DL — SIGNIFICANT CHANGE UP (ref 6–8.3)
RBC # BLD: 4.42 M/UL — SIGNIFICANT CHANGE UP (ref 3.8–5.2)
RBC # FLD: 14.3 % — SIGNIFICANT CHANGE UP (ref 10.3–14.5)
SODIUM SERPL-SCNC: 138 MMOL/L — SIGNIFICANT CHANGE UP (ref 135–145)
WBC # BLD: 7.92 K/UL — SIGNIFICANT CHANGE UP (ref 3.8–10.5)
WBC # FLD AUTO: 7.92 K/UL — SIGNIFICANT CHANGE UP (ref 3.8–10.5)

## 2025-08-18 PROCEDURE — 74177 CT ABD & PELVIS W/CONTRAST: CPT

## 2025-08-18 PROCEDURE — 85025 COMPLETE CBC W/AUTO DIFF WBC: CPT

## 2025-08-18 PROCEDURE — 80053 COMPREHEN METABOLIC PANEL: CPT

## 2025-08-18 PROCEDURE — 99285 EMERGENCY DEPT VISIT HI MDM: CPT

## 2025-08-18 PROCEDURE — 96374 THER/PROPH/DIAG INJ IV PUSH: CPT | Mod: XU

## 2025-08-18 PROCEDURE — 99284 EMERGENCY DEPT VISIT MOD MDM: CPT | Mod: 25

## 2025-08-18 PROCEDURE — 74177 CT ABD & PELVIS W/CONTRAST: CPT | Mod: 26

## 2025-08-18 PROCEDURE — 36415 COLL VENOUS BLD VENIPUNCTURE: CPT

## 2025-08-18 PROCEDURE — 83690 ASSAY OF LIPASE: CPT

## 2025-08-18 PROCEDURE — 96375 TX/PRO/DX INJ NEW DRUG ADDON: CPT

## 2025-08-18 RX ORDER — KETOROLAC TROMETHAMINE 30 MG/ML
30 INJECTION, SOLUTION INTRAMUSCULAR; INTRAVENOUS ONCE
Refills: 0 | Status: DISCONTINUED | OUTPATIENT
Start: 2025-08-18 | End: 2025-08-18

## 2025-08-18 RX ORDER — OXYCODONE HYDROCHLORIDE AND ACETAMINOPHEN 10; 325 MG/1; MG/1
1 TABLET ORAL
Qty: 10 | Refills: 0
Start: 2025-08-18

## 2025-08-18 RX ORDER — ONDANSETRON HCL/PF 4 MG/2 ML
4 VIAL (ML) INJECTION ONCE
Refills: 0 | Status: COMPLETED | OUTPATIENT
Start: 2025-08-18 | End: 2025-08-18

## 2025-08-18 RX ADMIN — KETOROLAC TROMETHAMINE 30 MILLIGRAM(S): 30 INJECTION, SOLUTION INTRAMUSCULAR; INTRAVENOUS at 19:03

## 2025-08-18 RX ADMIN — Medication 4 MILLIGRAM(S): at 19:03

## 2025-09-19 ENCOUNTER — APPOINTMENT (OUTPATIENT)
Dept: NEUROSURGERY | Facility: CLINIC | Age: 60
End: 2025-09-19

## 2025-09-19 VITALS
HEART RATE: 98 BPM | BODY MASS INDEX: 34.04 KG/M2 | SYSTOLIC BLOOD PRESSURE: 104 MMHG | WEIGHT: 185 LBS | HEIGHT: 62 IN | OXYGEN SATURATION: 100 % | DIASTOLIC BLOOD PRESSURE: 73 MMHG | RESPIRATION RATE: 18 BRPM

## 2025-09-19 DIAGNOSIS — R41.3 OTHER AMNESIA: ICD-10-CM
